# Patient Record
Sex: FEMALE | Race: WHITE | NOT HISPANIC OR LATINO | Employment: FULL TIME | URBAN - NONMETROPOLITAN AREA
[De-identification: names, ages, dates, MRNs, and addresses within clinical notes are randomized per-mention and may not be internally consistent; named-entity substitution may affect disease eponyms.]

---

## 2017-02-09 ENCOUNTER — TELEPHONE (OUTPATIENT)
Dept: LACTATION | Facility: HOSPITAL | Age: 27
End: 2017-02-09

## 2017-02-09 NOTE — TELEPHONE ENCOUNTER
"Infant, Bryant 8 months    Called inquiring about BF status. Dad, Alex, says BF going well though wife's milk productions seems to have slowed past week or so. Family all has colds. Kate pumps at work and BFs when home. Alex reports Kate says \"Come hell or high water I am going for one year!\"  Much praise given. Offered help with supply issue if needed, for Kate to call me for same. (Pt at work at present. )  "

## 2017-02-09 NOTE — TELEPHONE ENCOUNTER
Mailed Kate BF Certificate congratulating her on 8 months of BFing. Also enclosed [employer] card, handout on toddler nursing, weaning gradually after one year, lactation contact card, and note of praise.

## 2017-02-14 ENCOUNTER — HOSPITAL ENCOUNTER (OUTPATIENT)
Dept: GENERAL RADIOLOGY | Facility: HOSPITAL | Age: 27
Discharge: HOME | End: 2017-02-14
Admitting: NURSE PRACTITIONER

## 2017-02-14 ENCOUNTER — LAB (OUTPATIENT)
Dept: LAB | Facility: HOSPITAL | Age: 27
End: 2017-02-14

## 2017-02-14 ENCOUNTER — TRANSCRIBE ORDERS (OUTPATIENT)
Dept: LAB | Facility: HOSPITAL | Age: 27
End: 2017-02-14

## 2017-02-14 DIAGNOSIS — R10.10 UPPER ABDOMINAL PAIN: ICD-10-CM

## 2017-02-14 DIAGNOSIS — R10.10 UPPER ABDOMINAL PAIN: Primary | ICD-10-CM

## 2017-02-14 LAB
ALBUMIN SERPL-MCNC: 4.6 G/DL (ref 3.5–5)
ALBUMIN/GLOB SERPL: 1.3 G/DL (ref 1.1–2.5)
ALP SERPL-CCNC: 97 U/L (ref 24–120)
ALT SERPL W P-5'-P-CCNC: 48 U/L (ref 0–54)
AMYLASE SERPL-CCNC: 53 U/L (ref 30–110)
ANION GAP SERPL CALCULATED.3IONS-SCNC: 14 MMOL/L (ref 4–13)
AST SERPL-CCNC: 27 U/L (ref 7–45)
AUTO MIXED CELLS #: 0.5 10*3/UL (ref 0.1–2.6)
AUTO MIXED CELLS %: 6.9 % (ref 0.1–24)
B-HCG UR QL: NEGATIVE
BILIRUB SERPL-MCNC: 0.4 MG/DL (ref 0.1–1)
BILIRUB UR QL STRIP: NEGATIVE
BUN BLD-MCNC: 13 MG/DL (ref 5–21)
BUN/CREAT SERPL: 27.1
CALCIUM SPEC-SCNC: 9.4 MG/DL (ref 8.4–10.4)
CHLORIDE SERPL-SCNC: 102 MMOL/L (ref 98–110)
CLARITY UR: CLEAR
CO2 SERPL-SCNC: 26 MMOL/L (ref 24–31)
COLOR UR: YELLOW
CREAT BLD-MCNC: 0.48 MG/DL (ref 0.5–1.4)
ERYTHROCYTE [DISTWIDTH] IN BLOOD BY AUTOMATED COUNT: 12.1 % (ref 12–15)
GFR SERPL CREATININE-BSD FRML MDRD: >150 ML/MIN/1.73
GLOBULIN UR ELPH-MCNC: 3.5 GM/DL
GLUCOSE BLD-MCNC: 88 MG/DL (ref 70–100)
GLUCOSE UR STRIP-MCNC: NEGATIVE MG/DL
HCT VFR BLD AUTO: 34.4 % (ref 37–47)
HGB BLD-MCNC: 12 G/DL (ref 12–16)
HGB UR QL STRIP.AUTO: NEGATIVE
KETONES UR QL STRIP: NEGATIVE
LEUKOCYTE ESTERASE UR QL STRIP.AUTO: NEGATIVE
LIPASE SERPL-CCNC: 81 U/L (ref 23–203)
LYMPHOCYTES # BLD AUTO: 2.3 10*3/MM3 (ref 0.8–7)
LYMPHOCYTES NFR BLD AUTO: 30.1 % (ref 15–45)
MCH RBC QN AUTO: 31.2 PG (ref 28–32)
MCHC RBC AUTO-ENTMCNC: 34.9 G/DL (ref 33–36)
MCV RBC AUTO: 89.4 FL (ref 82–98)
NEUTROPHILS # BLD AUTO: 4.9 10*3/MM3 (ref 1.5–8.3)
NEUTROPHILS NFR BLD AUTO: 63 % (ref 39–78)
NITRITE UR QL STRIP: NEGATIVE
PH UR STRIP.AUTO: 6 [PH] (ref 5–8)
PLATELET # BLD AUTO: 238 10*3/MM3 (ref 130–400)
PMV BLD AUTO: 9.4 FL (ref 6–12)
POTASSIUM BLD-SCNC: 4.4 MMOL/L (ref 3.5–5.3)
PROT SERPL-MCNC: 8.1 G/DL (ref 6.3–8.7)
PROT UR QL STRIP: NEGATIVE
RBC # BLD AUTO: 3.85 10*6/MM3 (ref 4.2–5.4)
SODIUM BLD-SCNC: 142 MMOL/L (ref 135–145)
SP GR UR STRIP: 1.02 (ref 1–1.03)
UROBILINOGEN UR QL STRIP: NORMAL
WBC NRBC COR # BLD: 7.7 10*3/MM3 (ref 4.8–10.8)

## 2017-02-14 PROCEDURE — 83690 ASSAY OF LIPASE: CPT

## 2017-02-14 PROCEDURE — 82150 ASSAY OF AMYLASE: CPT

## 2017-02-14 PROCEDURE — 80053 COMPREHEN METABOLIC PANEL: CPT

## 2017-02-14 PROCEDURE — 81025 URINE PREGNANCY TEST: CPT

## 2017-02-14 PROCEDURE — 81003 URINALYSIS AUTO W/O SCOPE: CPT

## 2017-02-14 PROCEDURE — 36415 COLL VENOUS BLD VENIPUNCTURE: CPT

## 2017-02-14 PROCEDURE — 74000 HC ABDOMEN KUB: CPT

## 2017-02-14 PROCEDURE — 85025 COMPLETE CBC W/AUTO DIFF WBC: CPT

## 2017-09-29 ENCOUNTER — TRANSCRIBE ORDERS (OUTPATIENT)
Dept: ADMINISTRATIVE | Facility: HOSPITAL | Age: 27
End: 2017-09-29

## 2017-09-29 ENCOUNTER — APPOINTMENT (OUTPATIENT)
Dept: LAB | Facility: HOSPITAL | Age: 27
End: 2017-09-29

## 2017-09-29 ENCOUNTER — APPOINTMENT (OUTPATIENT)
Dept: ONCOLOGY | Facility: HOSPITAL | Age: 27
End: 2017-09-29

## 2017-09-29 DIAGNOSIS — N80.129 ENDOMETRIOMA: Primary | ICD-10-CM

## 2017-09-29 PROBLEM — O00.90 ECTOPIC PREGNANCY: Status: ACTIVE | Noted: 2017-09-29

## 2017-10-05 ENCOUNTER — INFUSION (OUTPATIENT)
Dept: ONCOLOGY | Facility: HOSPITAL | Age: 27
End: 2017-10-05

## 2017-10-05 ENCOUNTER — TRANSCRIBE ORDERS (OUTPATIENT)
Dept: ADMINISTRATIVE | Facility: HOSPITAL | Age: 27
End: 2017-10-05

## 2017-10-05 ENCOUNTER — APPOINTMENT (OUTPATIENT)
Dept: LAB | Facility: HOSPITAL | Age: 27
End: 2017-10-05

## 2017-10-05 VITALS
SYSTOLIC BLOOD PRESSURE: 138 MMHG | RESPIRATION RATE: 20 BRPM | HEIGHT: 60 IN | WEIGHT: 145 LBS | BODY MASS INDEX: 28.47 KG/M2 | DIASTOLIC BLOOD PRESSURE: 74 MMHG | TEMPERATURE: 99.2 F | OXYGEN SATURATION: 100 % | HEART RATE: 82 BPM

## 2017-10-05 DIAGNOSIS — R01.1 CARDIAC MURMUR: Primary | ICD-10-CM

## 2017-10-05 DIAGNOSIS — O00.90 ECTOPIC PREGNANCY, UNSPECIFIED LOCATION, UNSPECIFIED WHETHER INTRAUTERINE PREGNANCY PRESENT: Primary | ICD-10-CM

## 2017-10-05 LAB
ALBUMIN SERPL-MCNC: 4.7 G/DL (ref 3.5–5)
ALBUMIN/GLOB SERPL: 1.5 G/DL (ref 1.1–2.5)
ALP SERPL-CCNC: 106 U/L (ref 24–120)
ALT SERPL W P-5'-P-CCNC: 86 U/L (ref 0–54)
ANION GAP SERPL CALCULATED.3IONS-SCNC: 13 MMOL/L (ref 4–13)
AST SERPL-CCNC: 48 U/L (ref 7–45)
BILIRUB SERPL-MCNC: 0.3 MG/DL (ref 0.1–1)
BUN BLD-MCNC: 12 MG/DL (ref 5–21)
BUN/CREAT SERPL: 24 (ref 7–25)
CALCIUM SPEC-SCNC: 9.5 MG/DL (ref 8.4–10.4)
CHLORIDE SERPL-SCNC: 104 MMOL/L (ref 98–110)
CO2 SERPL-SCNC: 23 MMOL/L (ref 24–31)
CREAT BLD-MCNC: 0.5 MG/DL (ref 0.5–1.4)
DEPRECATED RDW RBC AUTO: 43.1 FL (ref 40–54)
ERYTHROCYTE [DISTWIDTH] IN BLOOD BY AUTOMATED COUNT: 12.7 % (ref 12–15)
GFR SERPL CREATININE-BSD FRML MDRD: 147 ML/MIN/1.73
GLOBULIN UR ELPH-MCNC: 3.1 GM/DL
GLUCOSE BLD-MCNC: 91 MG/DL (ref 70–100)
HCG INTACT+B SERPL-ACNC: 4025.5 MIU/ML (ref 0–5)
HCT VFR BLD AUTO: 35.6 % (ref 37–47)
HGB BLD-MCNC: 11.7 G/DL (ref 12–16)
MCH RBC QN AUTO: 30.4 PG (ref 28–32)
MCHC RBC AUTO-ENTMCNC: 32.9 G/DL (ref 33–36)
MCV RBC AUTO: 92.5 FL (ref 82–98)
PLATELET # BLD AUTO: 222 10*3/MM3 (ref 130–400)
PMV BLD AUTO: 10.6 FL (ref 6–12)
POTASSIUM BLD-SCNC: 4.3 MMOL/L (ref 3.5–5.3)
PROT SERPL-MCNC: 7.8 G/DL (ref 6.3–8.7)
RBC # BLD AUTO: 3.85 10*6/MM3 (ref 4.2–5.4)
SODIUM BLD-SCNC: 140 MMOL/L (ref 135–145)
WBC NRBC COR # BLD: 5 10*3/MM3 (ref 4.8–10.8)

## 2017-10-05 PROCEDURE — 84702 CHORIONIC GONADOTROPIN TEST: CPT | Performed by: OBSTETRICS & GYNECOLOGY

## 2017-10-05 PROCEDURE — 96401 CHEMO ANTI-NEOPL SQ/IM: CPT

## 2017-10-05 PROCEDURE — 85027 COMPLETE CBC AUTOMATED: CPT | Performed by: OBSTETRICS & GYNECOLOGY

## 2017-10-05 PROCEDURE — 80053 COMPREHEN METABOLIC PANEL: CPT | Performed by: OBSTETRICS & GYNECOLOGY

## 2017-10-05 PROCEDURE — 25010000002 METHOTREXATE PF 100 MG/4ML SOLUTION: Performed by: OBSTETRICS & GYNECOLOGY

## 2017-10-05 PROCEDURE — 36415 COLL VENOUS BLD VENIPUNCTURE: CPT

## 2017-10-05 RX ORDER — METHOTREXATE 25 MG/ML
50 INJECTION INTRA-ARTERIAL; INTRAMUSCULAR; INTRATHECAL; INTRAVENOUS ONCE
Status: CANCELLED | OUTPATIENT
Start: 2017-10-05

## 2017-10-05 RX ORDER — METHOTREXATE 25 MG/ML
50 INJECTION, SOLUTION INTRA-ARTERIAL; INTRAMUSCULAR; INTRATHECAL; INTRAVENOUS ONCE
Status: COMPLETED | OUTPATIENT
Start: 2017-10-05 | End: 2017-10-05

## 2017-10-05 RX ADMIN — METHOTREXATE 82 MG: 25 INJECTION, SOLUTION INTRA-ARTERIAL; INTRAMUSCULAR; INTRATHECAL; INTRAVENOUS at 13:30

## 2017-10-05 NOTE — PATIENT INSTRUCTIONS
Methotrexate injection  What is this medicine?  METHOTREXATE (METH oh TREX ate) is a chemotherapy drug used to treat cancer including breast cancer, leukemia, and lymphoma. This medicine can also be used to treat psoriasis and certain kinds of arthritis.  This medicine may be used for other purposes; ask your health care provider or pharmacist if you have questions.  What should I tell my health care provider before I take this medicine?  They need to know if you have any of these conditions:  -fluid in the stomach area or lungs  -if you often drink alcohol  -infection or immune system problems  -kidney disease  -liver disease  -low blood counts, like low white cell, platelet, or red cell counts  -lung disease  -radiation therapy  -stomach ulcers  -ulcerative colitis  -an unusual or allergic reaction to methotrexate, other medicines, foods, dyes, or preservatives  -pregnant or trying to get pregnant  -breast-feeding  How should I use this medicine?  This medicine is for infusion into a vein or for injection into muscle or into the spinal fluid (whichever applies). It is usually given by a health care professional in a hospital or clinic setting.  In rare cases, you might get this medicine at home. You will be taught how to give this medicine. Use exactly as directed. Take your medicine at regular intervals. Do not take your medicine more often than directed.  If this medicine is used for arthritis or psoriasis, it should be taken weekly, NOT daily.  It is important that you put your used needles and syringes in a special sharps container. Do not put them in a trash can. If you do not have a sharps container, call your pharmacist or healthcare provider to get one.  Talk to your pediatrician regarding the use of this medicine in children. While this drug may be prescribed for children as young as 2 years for selected conditions, precautions do apply.  Overdosage: If you think you have taken too much of this medicine  contact a poison control center or emergency room at once.  NOTE: This medicine is only for you. Do not share this medicine with others.  What if I miss a dose?  It is important not to miss your dose. Call your doctor or health care professional if you are unable to keep an appointment. If you give yourself the medicine and you miss a dose, talk with your doctor or health care professional. Do not take double or extra doses.  What may interact with this medicine?  This medicine may interact with the following medications:  -acitretin  -aspirin or aspirin-like medicines including salicylates  -azathioprine  -certain antibiotics like chloramphenicol, penicillin, tetracycline  -certain medicines for stomach problems like esomeprazole, omeprazole, pantoprazole  -cyclosporine  -gold  -hydroxychloroquine  -live virus vaccines  -mercaptopurine  -NSAIDs, medicines for pain and inflammation, like ibuprofen or naproxen  -other cytotoxic agents  -penicillamine  -phenylbutazone  -phenytoin  -probenacid  -retinoids such as isotretinoin and tretinoin  -steroid medicines like prednisone or cortisone  -sulfonamides like sulfasalazine and trimethoprim/sulfamethoxazole  -theophylline  This list may not describe all possible interactions. Give your health care provider a list of all the medicines, herbs, non-prescription drugs, or dietary supplements you use. Also tell them if you smoke, drink alcohol, or use illegal drugs. Some items may interact with your medicine.  What should I watch for while using this medicine?  Avoid alcoholic drinks.  In some cases, you may be given additional medicines to help with side effects. Follow all directions for their use.  This medicine can make you more sensitive to the sun. Keep out of the sun. If you cannot avoid being in the sun, wear protective clothing and use sunscreen. Do not use sun lamps or tanning beds/booths.  You may get drowsy or dizzy. Do not drive, use machinery, or do anything that  needs mental alertness until you know how this medicine affects you. Do not stand or sit up quickly, especially if you are an older patient. This reduces the risk of dizzy or fainting spells.  You may need blood work done while you are taking this medicine.  Call your doctor or health care professional for advice if you get a fever, chills or sore throat, or other symptoms of a cold or flu. Do not treat yourself. This drug decreases your body's ability to fight infections. Try to avoid being around people who are sick.  This medicine may increase your risk to bruise or bleed. Call your doctor or health care professional if you notice any unusual bleeding.  Check with your doctor or health care professional if you get an attack of severe diarrhea, nausea and vomiting, or if you sweat a lot. The loss of too much body fluid can make it dangerous for you to take this medicine.  Talk to your doctor about your risk of cancer. You may be more at risk for certain types of cancers if you take this medicine.  Both men and women must use effective birth control with this medicine. Do not become pregnant while taking this medicine or until at least 1 normal menstrual cycle has occurred after stopping it. Women should inform their doctor if they wish to become pregnant or think they might be pregnant. Men should not father a child while taking this medicine and for 3 months after stopping it. There is a potential for serious side effects to an unborn child. Talk to your health care professional or pharmacist for more information. Do not breast-feed an infant while taking this medicine.  What side effects may I notice from receiving this medicine?  Side effects that you should report to your doctor or health care professional as soon as possible:  -allergic reactions like skin rash, itching or hives, swelling of the face, lips, or tongue  -back pain  -breathing problems or shortness of breath  -confusion  -diarrhea  -dry,  nonproductive cough  -low blood counts - this medicine may decrease the number of white blood cells, red blood cells and platelets. You may be at increased risk of infections and bleeding  -mouth sores  -redness, blistering, peeling or loosening of the skin, including inside the mouth  -seizures  -severe headaches  -signs of infection - fever or chills, cough, sore throat, pain or difficulty passing urine  -signs and symptoms of bleeding such as bloody or black, tarry stools; red or dark-brown urine; spitting up blood or brown material that looks like coffee grounds; red spots on the skin; unusual bruising or bleeding from the eye, gums, or nose  -signs and symptoms of kidney injury like trouble passing urine or change in the amount of urine  -signs and symptoms of liver injury like dark yellow or brown urine; general ill feeling or flu-like  symptoms; light-colored stools; loss of appetite; nausea; right upper belly pain; unusually weak or tired; yellowing of the eyes or skin  -stiff neck  -vomiting  Side effects that usually do not require medical attention (report to your doctor or health care professional if they continue or are bothersome):  -dizziness  -hair loss  -headache  -stomach pain  -upset stomach  This list may not describe all possible side effects. Call your doctor for medical advice about side effects. You may report side effects to FDA at 1-571-FDA-1887.  Where should I keep my medicine?  If you are using this medicine at home, you will be instructed on how to store this medicine. Throw away any unused medicine after the expiration date on the label.  NOTE: This sheet is a summary. It may not cover all possible information. If you have questions about this medicine, talk to your doctor, pharmacist, or health care provider.     © 2017, [employer]. (2016-04-07 12:36:41)  Methotrexate Treatment for an Ectopic Pregnancy, Care After  Refer to this sheet in the next few weeks. These  instructions provide you with information on caring for yourself after your procedure. Your health care provider may also give you more specific instructions. Your treatment has been planned according to current medical practices, but problems sometimes occur. Call your health care provider if you have any problems or questions after your procedure.  WHAT TO EXPECT AFTER THE PROCEDURE  You may have some abdominal cramping, vaginal bleeding, and fatigue in the first few days after taking methotrexate. Some other possible side effects of methotrexate include:  · Nausea.  · Vomiting.  · Diarrhea.  · Mouth sores.  · Swelling or irritation of the lining of your lungs (pneumonitis).  · Liver damage.  · Hair loss.  HOME CARE INSTRUCTIONS   After you have received the methotrexate medicine, you need to be careful of your activities and watch your condition for several weeks. It may take 1 week before your hormone levels return to normal.  · Keep all follow-up appointments as directed by your health care provider.  · Avoid traveling too far away from your health care provider.  · Do not have sexual intercourse until your health care provider says it is safe to do so.  · You may resume your usual diet.  · Limit strenuous activity.  · Do not take folic acid, prenatal vitamins, or other vitamins that contain folic acid.  · Do not take aspirin, ibuprofen, or naproxen (nonsteroidal anti-inflammatory drugs [NSAIDs]).  · Do not drink alcohol.  SEEK MEDICAL CARE IF:   · You cannot control your nausea and vomiting.  · You cannot control your diarrhea.  · You have sores in your mouth and want treatment.  · You need pain medicine for your abdominal pain.  · You have a rash.  · You are having a reaction to the medicine.  SEEK IMMEDIATE MEDICAL CARE IF:   · You have increasing abdominal or pelvic pain.  · You notice increased bleeding.  · You feel light-headed, or you faint.  · You have shortness of breath.  · Your heart rate  increases.  · You have a cough.  · You have chills.  · You have a fever.     This information is not intended to replace advice given to you by your health care provider. Make sure you discuss any questions you have with your health care provider.     Document Released: 12/06/2012 Document Revised: 12/23/2014 Document Reviewed: 10/06/2014  [employer] Patient Education ©2017 [employer]

## 2017-10-05 NOTE — PROGRESS NOTES
Patient reports to clinic for Methotrexate IM.  Patient tolerated well, denies issues other than some soreness.  Patient monitored for 30 minutes, VS WNL, pt discharged in stable condition.  PATRICIA Good

## 2017-10-11 ENCOUNTER — HOSPITAL ENCOUNTER (OUTPATIENT)
Dept: CARDIOLOGY | Facility: HOSPITAL | Age: 27
Discharge: HOME | End: 2017-10-11
Attending: INTERNAL MEDICINE | Admitting: INTERNAL MEDICINE

## 2017-10-11 VITALS — BODY MASS INDEX: 28.47 KG/M2 | HEIGHT: 60 IN | WEIGHT: 145 LBS

## 2017-10-11 DIAGNOSIS — R01.1 CARDIAC MURMUR: ICD-10-CM

## 2017-10-11 LAB
BH CV ECHO MEAS - AO MAX PG (FULL): 6.6 MMHG
BH CV ECHO MEAS - AO MAX PG: 10.9 MMHG
BH CV ECHO MEAS - AO MEAN PG (FULL): 5 MMHG
BH CV ECHO MEAS - AO MEAN PG: 8 MMHG
BH CV ECHO MEAS - AO ROOT AREA (BSA CORRECTED): 1.7
BH CV ECHO MEAS - AO ROOT AREA: 5.7 CM^2
BH CV ECHO MEAS - AO ROOT DIAM: 2.7 CM
BH CV ECHO MEAS - AO V2 MAX: 165 CM/SEC
BH CV ECHO MEAS - AO V2 MEAN: 142 CM/SEC
BH CV ECHO MEAS - AO V2 VTI: 43.3 CM
BH CV ECHO MEAS - AVA(I,A): 1.6 CM^2
BH CV ECHO MEAS - AVA(I,D): 1.6 CM^2
BH CV ECHO MEAS - AVA(V,A): 1.8 CM^2
BH CV ECHO MEAS - AVA(V,D): 1.8 CM^2
BH CV ECHO MEAS - BSA(HAYCOCK): 1.7 M^2
BH CV ECHO MEAS - BSA: 1.6 M^2
BH CV ECHO MEAS - BZI_BMI: 28.3 KILOGRAMS/M^2
BH CV ECHO MEAS - BZI_METRIC_HEIGHT: 152.4 CM
BH CV ECHO MEAS - BZI_METRIC_WEIGHT: 65.8 KG
BH CV ECHO MEAS - CONTRAST EF 4CH: 61.1 ML/M^2
BH CV ECHO MEAS - EDV(CUBED): 91.1 ML
BH CV ECHO MEAS - EDV(MOD-SP4): 88.2 ML
BH CV ECHO MEAS - EDV(TEICH): 92.4 ML
BH CV ECHO MEAS - EF(CUBED): 70.4 %
BH CV ECHO MEAS - EF(MOD-SP4): 61.1 %
BH CV ECHO MEAS - EF(TEICH): 62.1 %
BH CV ECHO MEAS - ESV(CUBED): 27 ML
BH CV ECHO MEAS - ESV(MOD-SP4): 34.3 ML
BH CV ECHO MEAS - ESV(TEICH): 35 ML
BH CV ECHO MEAS - FS: 33.3 %
BH CV ECHO MEAS - IVS/LVPW: 0.78
BH CV ECHO MEAS - IVSD: 0.7 CM
BH CV ECHO MEAS - LA DIMENSION: 3.4 CM
BH CV ECHO MEAS - LA/AO: 1.3
BH CV ECHO MEAS - LV DIASTOLIC VOL/BSA (35-75): 54.2 ML/M^2
BH CV ECHO MEAS - LV MASS(C)D: 113.6 GRAMS
BH CV ECHO MEAS - LV MASS(C)DI: 69.8 GRAMS/M^2
BH CV ECHO MEAS - LV MAX PG: 4.3 MMHG
BH CV ECHO MEAS - LV MEAN PG: 3 MMHG
BH CV ECHO MEAS - LV SYSTOLIC VOL/BSA (12-30): 21.1 ML/M^2
BH CV ECHO MEAS - LV V1 MAX: 104 CM/SEC
BH CV ECHO MEAS - LV V1 MEAN: 80.5 CM/SEC
BH CV ECHO MEAS - LV V1 VTI: 24.6 CM
BH CV ECHO MEAS - LVIDD: 4.5 CM
BH CV ECHO MEAS - LVIDS: 3 CM
BH CV ECHO MEAS - LVLD AP4: 8.6 CM
BH CV ECHO MEAS - LVLS AP4: 6.9 CM
BH CV ECHO MEAS - LVOT AREA (M): 2.8 CM^2
BH CV ECHO MEAS - LVOT AREA: 2.8 CM^2
BH CV ECHO MEAS - LVOT DIAM: 1.9 CM
BH CV ECHO MEAS - LVPWD: 0.9 CM
BH CV ECHO MEAS - MR ALIAS VEL: 38.5 CM/SEC
BH CV ECHO MEAS - MR ERO: 0.25 CM^2
BH CV ECHO MEAS - MR FLOW RATE: 60.5 CM^3/SEC
BH CV ECHO MEAS - MR MAX PG: 23.8 MMHG
BH CV ECHO MEAS - MR MAX VEL: 244 CM/SEC
BH CV ECHO MEAS - MR MEAN PG: 18 MMHG
BH CV ECHO MEAS - MR MEAN VEL: 202 CM/SEC
BH CV ECHO MEAS - MR PISA RADIUS: 0.5 CM
BH CV ECHO MEAS - MR PISA: 1.6 CM^2
BH CV ECHO MEAS - MR VOLUME: 22.6 ML
BH CV ECHO MEAS - MR VTI: 91 CM
BH CV ECHO MEAS - MV A MAX VEL: 69.4 CM/SEC
BH CV ECHO MEAS - MV DEC TIME: 0.32 SEC
BH CV ECHO MEAS - MV E MAX VEL: 103 CM/SEC
BH CV ECHO MEAS - MV E/A: 1.5
BH CV ECHO MEAS - RAP SYSTOLE: 5 MMHG
BH CV ECHO MEAS - RVSP: 21.8 MMHG
BH CV ECHO MEAS - SI(AO): 152.3 ML/M^2
BH CV ECHO MEAS - SI(CUBED): 39.4 ML/M^2
BH CV ECHO MEAS - SI(LVOT): 42.8 ML/M^2
BH CV ECHO MEAS - SI(MOD-SP4): 33.1 ML/M^2
BH CV ECHO MEAS - SI(TEICH): 35.3 ML/M^2
BH CV ECHO MEAS - SV(AO): 247.9 ML
BH CV ECHO MEAS - SV(CUBED): 64.1 ML
BH CV ECHO MEAS - SV(LVOT): 69.7 ML
BH CV ECHO MEAS - SV(MOD-SP4): 53.9 ML
BH CV ECHO MEAS - SV(TEICH): 57.4 ML
BH CV ECHO MEAS - TR MAX VEL: 205 CM/SEC
LEFT ATRIUM VOLUME INDEX: 28.3 ML/M2
LEFT ATRIUM VOLUME: 46.2 CM3
LV EF 2D ECHO EST: 65 %

## 2017-10-11 PROCEDURE — 93306 TTE W/DOPPLER COMPLETE: CPT | Performed by: INTERNAL MEDICINE

## 2017-10-11 PROCEDURE — 93306 TTE W/DOPPLER COMPLETE: CPT

## 2017-11-17 ENCOUNTER — TRANSCRIBE ORDERS (OUTPATIENT)
Dept: ADMINISTRATIVE | Facility: HOSPITAL | Age: 27
End: 2017-11-17

## 2017-11-17 DIAGNOSIS — N97.1 TUBAL OCCLUSION: Primary | ICD-10-CM

## 2017-11-24 ENCOUNTER — HOSPITAL ENCOUNTER (OUTPATIENT)
Dept: GENERAL RADIOLOGY | Facility: HOSPITAL | Age: 27
Discharge: HOME | End: 2017-11-24
Attending: OBSTETRICS & GYNECOLOGY | Admitting: OBSTETRICS & GYNECOLOGY

## 2017-11-24 VITALS — SYSTOLIC BLOOD PRESSURE: 129 MMHG | DIASTOLIC BLOOD PRESSURE: 90 MMHG | HEART RATE: 72 BPM | OXYGEN SATURATION: 100 %

## 2017-11-24 DIAGNOSIS — N97.1 TUBAL OCCLUSION: ICD-10-CM

## 2017-11-24 PROCEDURE — 74740 X-RAY FEMALE GENITAL TRACT: CPT

## 2017-11-24 PROCEDURE — 0 IOPAMIDOL 61 % SOLUTION: Performed by: OBSTETRICS & GYNECOLOGY

## 2017-11-24 RX ADMIN — IOPAMIDOL 10 ML: 612 INJECTION, SOLUTION INTRAVENOUS at 10:20

## 2018-05-15 ENCOUNTER — APPOINTMENT (OUTPATIENT)
Dept: ONCOLOGY | Facility: HOSPITAL | Age: 28
End: 2018-05-15

## 2018-05-15 ENCOUNTER — HOSPITAL ENCOUNTER (OUTPATIENT)
Facility: HOSPITAL | Age: 28
Discharge: HOME | End: 2018-05-15
Attending: OBSTETRICS & GYNECOLOGY | Admitting: OBSTETRICS & GYNECOLOGY

## 2018-05-15 VITALS — BODY MASS INDEX: 30.63 KG/M2 | HEIGHT: 60 IN | WEIGHT: 156 LBS

## 2018-05-15 PROCEDURE — 96372 THER/PROPH/DIAG INJ SC/IM: CPT

## 2018-05-15 PROCEDURE — G0378 HOSPITAL OBSERVATION PER HR: HCPCS

## 2018-05-15 PROCEDURE — 25010000002 METHOTREXATE SODIUM SOLUTION: Performed by: OBSTETRICS & GYNECOLOGY

## 2018-05-15 RX ORDER — METHOTREXATE SODIUM 25 MG/ML
85 INJECTION, SOLUTION INTRA-ARTERIAL; INTRAMUSCULAR; INTRAVENOUS ONCE
Status: COMPLETED | OUTPATIENT
Start: 2018-05-15 | End: 2018-05-15

## 2018-05-15 RX ADMIN — METHOTREXATE 85 MG: 25 INJECTION, SOLUTION INTRA-ARTERIAL; INTRAMUSCULAR; INTRAVENOUS at 16:20

## 2018-05-16 PROBLEM — Z34.90 PREGNANCY: Status: ACTIVE | Noted: 2018-05-16

## 2018-05-18 ENCOUNTER — HOSPITAL ENCOUNTER (OUTPATIENT)
Facility: HOSPITAL | Age: 28
Setting detail: OBSERVATION
Discharge: HOME | End: 2018-05-19
Attending: OBSTETRICS & GYNECOLOGY | Admitting: OBSTETRICS & GYNECOLOGY

## 2018-05-18 PROBLEM — E86.0 DEHYDRATION: Status: ACTIVE | Noted: 2018-05-18

## 2018-05-18 LAB
ALBUMIN SERPL-MCNC: 4.5 G/DL (ref 3.5–5)
ALBUMIN/GLOB SERPL: 1.4 G/DL (ref 1.1–2.5)
ALP SERPL-CCNC: 83 U/L (ref 24–120)
ALT SERPL W P-5'-P-CCNC: 42 U/L (ref 0–54)
ANION GAP SERPL CALCULATED.3IONS-SCNC: 11 MMOL/L (ref 4–13)
AST SERPL-CCNC: 43 U/L (ref 7–45)
BACTERIA UR QL AUTO: ABNORMAL /HPF
BILIRUB SERPL-MCNC: 0.6 MG/DL (ref 0.1–1)
BILIRUB UR QL STRIP: NEGATIVE
BUN BLD-MCNC: 9 MG/DL (ref 5–21)
BUN/CREAT SERPL: 16.7 (ref 7–25)
CALCIUM SPEC-SCNC: 8.9 MG/DL (ref 8.4–10.4)
CHLORIDE SERPL-SCNC: 100 MMOL/L (ref 98–110)
CLARITY UR: CLEAR
CO2 SERPL-SCNC: 27 MMOL/L (ref 24–31)
COLOR UR: YELLOW
CREAT BLD-MCNC: 0.54 MG/DL (ref 0.5–1.4)
DEPRECATED RDW RBC AUTO: 39.5 FL (ref 40–54)
ERYTHROCYTE [DISTWIDTH] IN BLOOD BY AUTOMATED COUNT: 11.9 % (ref 12–15)
GFR SERPL CREATININE-BSD FRML MDRD: 133 ML/MIN/1.73
GLOBULIN UR ELPH-MCNC: 3.2 GM/DL
GLUCOSE BLD-MCNC: 104 MG/DL (ref 70–100)
GLUCOSE UR STRIP-MCNC: NEGATIVE MG/DL
HCG INTACT+B SERPL-ACNC: 324.58 MIU/ML (ref 0–5)
HCT VFR BLD AUTO: 32.3 % (ref 37–47)
HGB BLD-MCNC: 10.9 G/DL (ref 12–16)
HGB UR QL STRIP.AUTO: ABNORMAL
HYALINE CASTS UR QL AUTO: ABNORMAL /LPF
KETONES UR QL STRIP: NEGATIVE
LEUKOCYTE ESTERASE UR QL STRIP.AUTO: NEGATIVE
MCH RBC QN AUTO: 30.5 PG (ref 28–32)
MCHC RBC AUTO-ENTMCNC: 33.7 G/DL (ref 33–36)
MCV RBC AUTO: 90.5 FL (ref 82–98)
NITRITE UR QL STRIP: NEGATIVE
PH UR STRIP.AUTO: 6 [PH] (ref 5–8)
PLATELET # BLD AUTO: 185 10*3/MM3 (ref 130–400)
PMV BLD AUTO: 10.1 FL (ref 6–12)
POTASSIUM BLD-SCNC: 4.1 MMOL/L (ref 3.5–5.3)
PROT SERPL-MCNC: 7.7 G/DL (ref 6.3–8.7)
PROT UR QL STRIP: NEGATIVE
RBC # BLD AUTO: 3.57 10*6/MM3 (ref 4.2–5.4)
RBC # UR: ABNORMAL /HPF
REF LAB TEST METHOD: ABNORMAL
SODIUM BLD-SCNC: 138 MMOL/L (ref 135–145)
SP GR UR STRIP: 1.01 (ref 1–1.03)
SQUAMOUS #/AREA URNS HPF: ABNORMAL /HPF
UROBILINOGEN UR QL STRIP: ABNORMAL
WBC NRBC COR # BLD: 4.44 10*3/MM3 (ref 4.8–10.8)
WBC UR QL AUTO: ABNORMAL /HPF

## 2018-05-18 PROCEDURE — G0378 HOSPITAL OBSERVATION PER HR: HCPCS

## 2018-05-18 PROCEDURE — 85027 COMPLETE CBC AUTOMATED: CPT | Performed by: OBSTETRICS & GYNECOLOGY

## 2018-05-18 PROCEDURE — 80053 COMPREHEN METABOLIC PANEL: CPT | Performed by: OBSTETRICS & GYNECOLOGY

## 2018-05-18 PROCEDURE — 81001 URINALYSIS AUTO W/SCOPE: CPT | Performed by: OBSTETRICS & GYNECOLOGY

## 2018-05-18 PROCEDURE — 25010000002 KETOROLAC TROMETHAMINE PER 15 MG: Performed by: OBSTETRICS & GYNECOLOGY

## 2018-05-18 PROCEDURE — 84702 CHORIONIC GONADOTROPIN TEST: CPT | Performed by: OBSTETRICS & GYNECOLOGY

## 2018-05-18 PROCEDURE — 96375 TX/PRO/DX INJ NEW DRUG ADDON: CPT

## 2018-05-18 PROCEDURE — 96374 THER/PROPH/DIAG INJ IV PUSH: CPT

## 2018-05-18 PROCEDURE — 25010000002 ONDANSETRON PER 1 MG: Performed by: OBSTETRICS & GYNECOLOGY

## 2018-05-18 RX ORDER — DEXTROSE, SODIUM CHLORIDE, SODIUM LACTATE, POTASSIUM CHLORIDE, AND CALCIUM CHLORIDE 5; .6; .31; .03; .02 G/100ML; G/100ML; G/100ML; G/100ML; G/100ML
999 INJECTION, SOLUTION INTRAVENOUS ONCE
Status: COMPLETED | OUTPATIENT
Start: 2018-05-18 | End: 2018-05-18

## 2018-05-18 RX ORDER — DEXTROSE, SODIUM CHLORIDE, SODIUM LACTATE, POTASSIUM CHLORIDE, AND CALCIUM CHLORIDE 5; .6; .31; .03; .02 G/100ML; G/100ML; G/100ML; G/100ML; G/100ML
150 INJECTION, SOLUTION INTRAVENOUS CONTINUOUS
Status: DISCONTINUED | OUTPATIENT
Start: 2018-05-18 | End: 2018-05-19 | Stop reason: HOSPADM

## 2018-05-18 RX ORDER — PROMETHAZINE HYDROCHLORIDE 25 MG/ML
25 INJECTION, SOLUTION INTRAMUSCULAR; INTRAVENOUS EVERY 6 HOURS PRN
Status: DISCONTINUED | OUTPATIENT
Start: 2018-05-18 | End: 2018-05-19 | Stop reason: HOSPADM

## 2018-05-18 RX ORDER — SCOLOPAMINE TRANSDERMAL SYSTEM 1 MG/1
1 PATCH, EXTENDED RELEASE TRANSDERMAL
Status: DISCONTINUED | OUTPATIENT
Start: 2018-05-18 | End: 2018-05-19 | Stop reason: HOSPADM

## 2018-05-18 RX ORDER — ONDANSETRON HCL IN 0.9 % NACL 8 MG/50 ML
8 INTRAVENOUS SOLUTION, PIGGYBACK (ML) INTRAVENOUS EVERY 6 HOURS PRN
Status: DISCONTINUED | OUTPATIENT
Start: 2018-05-18 | End: 2018-05-19 | Stop reason: HOSPADM

## 2018-05-18 RX ORDER — KETOROLAC TROMETHAMINE 30 MG/ML
30 INJECTION, SOLUTION INTRAMUSCULAR; INTRAVENOUS EVERY 6 HOURS PRN
Status: DISCONTINUED | OUTPATIENT
Start: 2018-05-18 | End: 2018-05-19

## 2018-05-18 RX ADMIN — SODIUM CHLORIDE, SODIUM LACTATE, POTASSIUM CHLORIDE, CALCIUM CHLORIDE AND DEXTROSE MONOHYDRATE 999 ML/HR: 5; 600; 310; 30; 20 INJECTION, SOLUTION INTRAVENOUS at 12:37

## 2018-05-18 RX ADMIN — ONDANSETRON HYDROCHLORIDE 8 MG: 2 INJECTION INTRAMUSCULAR; INTRAVENOUS at 21:48

## 2018-05-18 RX ADMIN — KETOROLAC TROMETHAMINE 30 MG: 30 INJECTION, SOLUTION INTRAMUSCULAR at 17:12

## 2018-05-18 RX ADMIN — ASCORBIC ACID, VITAMIN A PALMITATE, CHOLECALCIFEROL, THIAMINE HYDROCHLORIDE, RIBOFLAVIN-5 PHOSPHATE SODIUM, PYRIDOXINE HYDROCHLORIDE, NIACINAMIDE, DEXPANTHENOL, ALPHA-TOCOPHEROL ACETATE, VITAMIN K1, FOLIC ACID, BIOTIN, CYANOCOBALAMIN: 200; 3300; 200; 6; 3.6; 6; 40; 15; 10; 150; 600; 60; 5 INJECTION, SOLUTION INTRAVENOUS at 16:52

## 2018-05-18 RX ADMIN — SCOPALAMINE 1 PATCH: 1 PATCH, EXTENDED RELEASE TRANSDERMAL at 12:23

## 2018-05-18 NOTE — H&P
[facility]  Obstetric History and Physical        Subjective     Patient is a 28 y.o. female recent diagnosis of possible early ectopic vs MAB; serum HCG with only 20 point rise in 72 hours.  No evidence of either intrauterine/extrauterine pregnancy on serial ultrasounds received methotrexate for presumptive  Diagnosis of early ectopic pregnancy on weds.  Dosage calculation confirmed by pharmacy prior to injection.  Patient stated two days later with general malaise and fatigue  And then started with vomiting and diarrhea early today.  No fevers and no recent exposure to infectious agent.  Possible adverse  Reaction to methotrexate.  No abdominal pain, and ultrasound in office only significant for PCO.  Patient was admitted for OSV/23 hour for IVF hydration and IV antiemetics.    The following portions of the patients history were reviewed and updated as appropriate: current medications, allergies, past medical history, past surgical history, past family history and past social history .       Prenatal Information:  Prenatal Results    The patient does not have a working LIZBETH. Some results will not display without a working LIZBETH.   Initial Prenatal Labs     Test Value Reference Range Date Time    Hemoglobin        Hematocrit        Platelets        Rubella IgG        Hepatitis B SAg        Hepatitis C Ab        RPR        ABO        Rh        Antibody Screen        HIV        Urine Culture        Gonorrhea        Chlamydia        TSH              2nd and 3rd Trimester     Test Value Reference Range Date Time    Hemoglobin (repeated)        Hematocrit (repeated)        GCT        Antibody Screen (repeated)        GTT Fasting        GTT 1 Hr        GTT 2 Hr        GTT 3 Hr        Group B Strep              Drug Screening     Test Value Reference Range Date Time    Amphetamine Screen        Barbiturate Screen        Benzodiazepine Screen        Methadone Screen        Phencyclidine Screen        Opiates Screen        THC  Screen        Cocaine Screen        Propoxyphene Screen        Buprenorphine Screen        Methamphetamine Screen        Oxycodone Screen        Tryicyclic Antidepressants Screen              Other (Risk screening)     Test Value Reference Range Date Time    Varicella IgG        Parvovirus IgG        CMV IgG        Cystic Fibrosis        Hemoglobin electrophoresis        NIPT        MSAFP-4        AFP (for NTD only)                       Past OB History:   Prior FT delivery  C/S              Ectopic pregnancy x 2 (MXT treated)                Past Medical History:  Denies              Past Surgical History:  Lasik, C/S              Social History:  Denies tobacco, alcohol and drug usage              NKDA              Family History:  CAD, DM              Medications:  Metformin              Gyn History:  Denies    Objective       Vital Signs Range for the last 24 hours  Temperature: Temp:  [98.9 °F (37.2 °C)-99.8 °F (37.7 °C)] 99.8 °F (37.7 °C)   Temp Source: Temp src: Temporal Artery    BP: BP: (126-128)/(65-76) 128/65   Pulse: Heart Rate:  [100-111] 100   Respirations: Resp:  [18-20] 18   SPO2: SpO2:  [99 %-100 %] 99 %   O2 Amount (l/min):     O2 Devices Device (Oxygen Therapy): room air   Weight:       Physical Examination: General appearance - dehydrated  Mental status - alert, oriented to person, place, and time  Neck - supple, no significant adenopathy  Chest - clear to auscultation, no wheezes, rales or rhonchi, symmetric air entry  Heart - normal rate, regular rhythm, normal S1, S2, no murmurs, rubs, clicks or gallops  Abdomen - soft, nontender, nondistended, no masses or organomegaly  Breasts - patient declines to have breast exam  Pelvic - normal external genitalia, vulva, vagina, cervix, uterus and adnexa  Back exam - full range of motion, no tenderness, palpable spasm or pain on motion  Neurological - alert, oriented, normal speech, no focal findings or movement disorder noted  Musculoskeletal - no joint  tenderness, deformity or swelling  Extremities - peripheral pulses normal, no pedal edema, no clubbing or cyanosis  Skin - dry with poor turgor           Assessment:  1.  S/P Methotrexate  2.  Dehydration    Plan:  1.  IVF hydration  2.  IV antiemetic  3.  Labs  4.  OSV- anticipate discharge in < 23 hours if clinically stable      Brandee Weinstein DO  5/18/2018  12:58 PM

## 2018-05-18 NOTE — PLAN OF CARE
Problem: Patient Care Overview  Goal: Plan of Care Review  Outcome: Ongoing (interventions implemented as appropriate)    Goal: Individualization and Mutuality  Outcome: Ongoing (interventions implemented as appropriate)    Goal: Discharge Needs Assessment  Outcome: Ongoing (interventions implemented as appropriate)    Goal: Interprofessional Rounds/Family Conf  Outcome: Ongoing (interventions implemented as appropriate)      Problem: Fluid Volume Deficit (Adult)  Goal: Identify Related Risk Factors and Signs and Symptoms  Outcome: Ongoing (interventions implemented as appropriate)    Goal: Optimal Fluid Balance  Outcome: Ongoing (interventions implemented as appropriate)

## 2018-05-19 VITALS
HEART RATE: 64 BPM | SYSTOLIC BLOOD PRESSURE: 126 MMHG | RESPIRATION RATE: 16 BRPM | TEMPERATURE: 98.8 F | OXYGEN SATURATION: 99 % | WEIGHT: 153.5 LBS | BODY MASS INDEX: 30.14 KG/M2 | DIASTOLIC BLOOD PRESSURE: 47 MMHG | HEIGHT: 60 IN

## 2018-05-19 PROCEDURE — 25010000002 KETOROLAC TROMETHAMINE PER 15 MG: Performed by: OBSTETRICS & GYNECOLOGY

## 2018-05-19 PROCEDURE — G0378 HOSPITAL OBSERVATION PER HR: HCPCS

## 2018-05-19 PROCEDURE — 96376 TX/PRO/DX INJ SAME DRUG ADON: CPT

## 2018-05-19 RX ORDER — LOPERAMIDE HYDROCHLORIDE 2 MG/1
4 CAPSULE ORAL ONCE
Status: COMPLETED | OUTPATIENT
Start: 2018-05-19 | End: 2018-05-19

## 2018-05-19 RX ORDER — ONDANSETRON 4 MG/1
4 TABLET, ORALLY DISINTEGRATING ORAL EVERY 8 HOURS PRN
Qty: 40 TABLET | Refills: 1 | Status: SHIPPED | OUTPATIENT
Start: 2018-05-19 | End: 2018-06-18

## 2018-05-19 RX ORDER — KETOROLAC TROMETHAMINE 30 MG/ML
30 INJECTION, SOLUTION INTRAMUSCULAR; INTRAVENOUS ONCE
Status: COMPLETED | OUTPATIENT
Start: 2018-05-19 | End: 2018-05-19

## 2018-05-19 RX ORDER — KETOROLAC TROMETHAMINE 30 MG/ML
30 INJECTION, SOLUTION INTRAMUSCULAR; INTRAVENOUS EVERY 6 HOURS PRN
Status: DISCONTINUED | OUTPATIENT
Start: 2018-05-19 | End: 2018-05-19 | Stop reason: HOSPADM

## 2018-05-19 RX ADMIN — KETOROLAC TROMETHAMINE 30 MG: 30 INJECTION, SOLUTION INTRAMUSCULAR at 09:41

## 2018-05-19 RX ADMIN — SODIUM CHLORIDE, SODIUM LACTATE, POTASSIUM CHLORIDE, CALCIUM CHLORIDE AND DEXTROSE MONOHYDRATE 150 ML/HR: 5; 600; 310; 30; 20 INJECTION, SOLUTION INTRAVENOUS at 09:41

## 2018-05-19 RX ADMIN — LOPERAMIDE HYDROCHLORIDE 4 MG: 2 CAPSULE ORAL at 09:41

## 2018-05-19 NOTE — DISCHARGE SUMMARY
Discharge Summary  Kate Veras    Admitting Dx:  1.  S/P Ectopic methotrexate  2.  Dehydration    Discharge Dx:  1.  Same/Resolve    Hospital Course:  This is a 28 year old female, with inappropriate rising beta HCG with  History of prior ectopic pregnancy.  After risks, benefits and alternatives  Reviewed elected for methotrexate.    Patient received methotrexate on weds, initially did well however seen  In the office on Friday with complaints of diarrhea and inability to tolerate  Little to no fluids since late Thursday evening.  Direct admission from the office for labs, IV antiemetics and aggressive  IVF hydration.  Patient responded well to supportive measures and on 5/19 requesting  Discharge home.  She was discharged home with zofran dot.  Scheduled fu in the office  For early this week.  Labs performed in the hospital were within normal ranges.  Beta HCG with appropriate rise (s/p MXT).

## 2018-05-19 NOTE — PLAN OF CARE
Problem: Patient Care Overview  Goal: Plan of Care Review  Outcome: Ongoing (interventions implemented as appropriate)   05/19/18 0406   Coping/Psychosocial   Plan of Care Reviewed With patient   Plan of Care Review   Progress improving   OTHER   Outcome Summary Pt with intermittent nausea and diarrhea, tolerating food, zofran given x1, up ad radhika, voiding, home today      Goal: Individualization and Mutuality  Outcome: Ongoing (interventions implemented as appropriate)    Goal: Discharge Needs Assessment  Outcome: Ongoing (interventions implemented as appropriate)    Goal: Interprofessional Rounds/Family Conf  Outcome: Ongoing (interventions implemented as appropriate)      Problem: Fluid Volume Deficit (Adult)  Goal: Identify Related Risk Factors and Signs and Symptoms  Outcome: Ongoing (interventions implemented as appropriate)    Goal: Optimal Fluid Balance  Outcome: Ongoing (interventions implemented as appropriate)

## 2019-10-22 ENCOUNTER — INITIAL PRENATAL (OUTPATIENT)
Dept: OBSTETRICS AND GYNECOLOGY | Facility: CLINIC | Age: 29
End: 2019-10-22

## 2019-10-22 ENCOUNTER — PROCEDURE VISIT (OUTPATIENT)
Dept: OBSTETRICS AND GYNECOLOGY | Facility: CLINIC | Age: 29
End: 2019-10-22

## 2019-10-22 VITALS — BODY MASS INDEX: 33.98 KG/M2 | DIASTOLIC BLOOD PRESSURE: 88 MMHG | SYSTOLIC BLOOD PRESSURE: 134 MMHG | WEIGHT: 174 LBS

## 2019-10-22 DIAGNOSIS — Z34.83 PRENATAL CARE, SUBSEQUENT PREGNANCY, THIRD TRIMESTER: ICD-10-CM

## 2019-10-22 DIAGNOSIS — Z78.9 NONSMOKER: ICD-10-CM

## 2019-10-22 DIAGNOSIS — O34.219 HISTORY OF CESAREAN DELIVERY, CURRENTLY PREGNANT: ICD-10-CM

## 2019-10-22 DIAGNOSIS — O36.63X0 EXCESSIVE FETAL GROWTH AFFECTING MANAGEMENT OF PREGNANCY IN THIRD TRIMESTER, SINGLE OR UNSPECIFIED FETUS: Primary | ICD-10-CM

## 2019-10-22 DIAGNOSIS — O24.410 DIET CONTROLLED GESTATIONAL DIABETES MELLITUS (GDM) IN THIRD TRIMESTER: Primary | ICD-10-CM

## 2019-10-22 PROCEDURE — 0502F SUBSEQUENT PRENATAL CARE: CPT | Performed by: OBSTETRICS & GYNECOLOGY

## 2019-10-22 PROCEDURE — 76816 OB US FOLLOW-UP PER FETUS: CPT | Performed by: OBSTETRICS & GYNECOLOGY

## 2019-10-22 RX ORDER — FERROUS SULFATE 325(65) MG
325 TABLET ORAL 3 TIMES DAILY
COMMUNITY
Start: 2019-10-10 | End: 2020-08-06

## 2019-10-22 NOTE — PROGRESS NOTES
[facility].  fsbs this morning was 78 fasing.  Postprandial have mostly been 120-130's.  Patient failed 1 hour GTT and decided to just treat this pregnancy like she had gestational diabetes since she had it in her previous pregnancy.  Patient had a degree in nutrition.  In addition, EFW 99.6% today.    Previous C/S, planning a repeat.  She does not desire salpingectomy.  Patient's first delivery was because she went into labor early.  Will plan repeat at 39 weeks.      Rash on abdomen, hydrocortisone and benadryl cream ok.    Patient had flu shot two weeks ago.  Reports she is Rubella non-immune, will get MMR in hospital after delivery    Schedule repeat C/S at next visit.

## 2019-11-05 ENCOUNTER — ROUTINE PRENATAL (OUTPATIENT)
Dept: OBSTETRICS AND GYNECOLOGY | Facility: CLINIC | Age: 29
End: 2019-11-05

## 2019-11-05 VITALS — SYSTOLIC BLOOD PRESSURE: 136 MMHG | DIASTOLIC BLOOD PRESSURE: 78 MMHG | WEIGHT: 177 LBS | BODY MASS INDEX: 34.57 KG/M2

## 2019-11-05 DIAGNOSIS — O24.415 ORAL HYPOGLYCEMIC CONTROLLED WHITE CLASSIFICATION A2 GESTATIONAL DIABETES MELLITUS (GDM): Primary | ICD-10-CM

## 2019-11-05 DIAGNOSIS — Z78.9 NONSMOKER: ICD-10-CM

## 2019-11-05 DIAGNOSIS — O34.219 HISTORY OF CESAREAN DELIVERY, CURRENTLY PREGNANT: ICD-10-CM

## 2019-11-05 DIAGNOSIS — O36.63X0 EXCESSIVE FETAL GROWTH AFFECTING MANAGEMENT OF PREGNANCY IN THIRD TRIMESTER, SINGLE OR UNSPECIFIED FETUS: ICD-10-CM

## 2019-11-05 PROCEDURE — 0502F SUBSEQUENT PRENATAL CARE: CPT | Performed by: OBSTETRICS & GYNECOLOGY

## 2019-11-05 RX ORDER — GLYBURIDE 5 MG/1
5 TABLET ORAL
Qty: 1 TABLET | Refills: 2 | Status: SHIPPED | OUTPATIENT
Start: 2019-11-05 | End: 2019-12-06 | Stop reason: HOSPADM

## 2019-11-05 RX ORDER — METHYLERGONOVINE MALEATE 0.2 MG/ML
200 INJECTION INTRAVENOUS ONCE AS NEEDED
Status: CANCELLED | OUTPATIENT
Start: 2019-11-05

## 2019-11-05 RX ORDER — SODIUM CHLORIDE 0.9 % (FLUSH) 0.9 %
3 SYRINGE (ML) INJECTION EVERY 12 HOURS SCHEDULED
Status: CANCELLED | OUTPATIENT
Start: 2019-11-05

## 2019-11-05 RX ORDER — CARBOPROST TROMETHAMINE 250 UG/ML
250 INJECTION, SOLUTION INTRAMUSCULAR AS NEEDED
Status: CANCELLED | OUTPATIENT
Start: 2019-11-05

## 2019-11-05 RX ORDER — SODIUM CHLORIDE 0.9 % (FLUSH) 0.9 %
10 SYRINGE (ML) INJECTION AS NEEDED
Status: CANCELLED | OUTPATIENT
Start: 2019-11-05

## 2019-11-05 RX ORDER — LIDOCAINE HYDROCHLORIDE 10 MG/ML
5 INJECTION, SOLUTION EPIDURAL; INFILTRATION; INTRACAUDAL; PERINEURAL AS NEEDED
Status: CANCELLED | OUTPATIENT
Start: 2019-11-05

## 2019-11-05 RX ORDER — MISOPROSTOL 100 UG/1
800 TABLET ORAL AS NEEDED
Status: CANCELLED | OUTPATIENT
Start: 2019-11-05

## 2019-11-05 NOTE — PROGRESS NOTES
Rash less-bothersome now.  She now reports some itching on chest and thighs, but there is no visible rash and she patches of pruritis are migratory.  Patient with mucus-like discharge and possible occasional contractions.  fsbs fasting is usually around 115.  Postprandial usually 120-130's.  Patient took glyburide with last pregnancy and wants to resume that; she declines insulin therapy.  Starting 2.5 mg qhs  Good FM.  Scheduling repeat C/S for Dec 19th  Patient with h/o PTL with first delivery at 35 weeks.  She was offered weekly Rosmery injections, but also cautiously advised against it by the NP in Js's office, who told her that stillbirth was one of the possible side effects.

## 2019-11-12 ENCOUNTER — ROUTINE PRENATAL (OUTPATIENT)
Dept: OBSTETRICS AND GYNECOLOGY | Facility: CLINIC | Age: 29
End: 2019-11-12

## 2019-11-12 ENCOUNTER — PROCEDURE VISIT (OUTPATIENT)
Dept: OBSTETRICS AND GYNECOLOGY | Facility: CLINIC | Age: 29
End: 2019-11-12

## 2019-11-12 VITALS — BODY MASS INDEX: 34.96 KG/M2 | SYSTOLIC BLOOD PRESSURE: 124 MMHG | DIASTOLIC BLOOD PRESSURE: 90 MMHG | WEIGHT: 179 LBS

## 2019-11-12 DIAGNOSIS — Z78.9 NONSMOKER: ICD-10-CM

## 2019-11-12 DIAGNOSIS — O34.219 HISTORY OF CESAREAN DELIVERY, CURRENTLY PREGNANT: ICD-10-CM

## 2019-11-12 DIAGNOSIS — O24.410 DIET CONTROLLED GESTATIONAL DIABETES MELLITUS (GDM) IN THIRD TRIMESTER: Primary | ICD-10-CM

## 2019-11-12 DIAGNOSIS — L29.9 SEVERE ITCHING: ICD-10-CM

## 2019-11-12 DIAGNOSIS — O24.415 ORAL HYPOGLYCEMIC CONTROLLED WHITE CLASSIFICATION A2 GESTATIONAL DIABETES MELLITUS (GDM): Primary | ICD-10-CM

## 2019-11-12 DIAGNOSIS — Z34.83 PRENATAL CARE, SUBSEQUENT PREGNANCY, THIRD TRIMESTER: ICD-10-CM

## 2019-11-12 PROCEDURE — 76819 FETAL BIOPHYS PROFIL W/O NST: CPT | Performed by: OBSTETRICS & GYNECOLOGY

## 2019-11-12 PROCEDURE — 0502F SUBSEQUENT PRENATAL CARE: CPT | Performed by: OBSTETRICS & GYNECOLOGY

## 2019-11-12 NOTE — PROGRESS NOTES
Patient still c/o pruritic rash.  Now her belly, hands, arms and feet are all affected.  She does have visible erythematous rash on belly, although some of it may be from scratching.  Fasting fsbs lowest has been 75, but usually low 100's.  Currently taking 2.5 mg glyburide at night.  Postprandial fsbs are usually about 135.  Good FM.  BPP 8/8 today.  GWENDOLYN 13.1 cm  Checking bile salt acids today.

## 2019-11-15 LAB
BILE AC SERPL-SCNC: 1.3 UMOL/L
CDCAE SERPL-SCNC: 0.4 UMOL/L
CHOLATE SERPL-SCNC: 0.3 UMOL/L
DO-CHOLATE SERPL-SCNC: 0.6 UMOL/L
URSODEOXYCHOLATE SERPL-SCNC: <0.1 UMOL/L

## 2019-11-18 NOTE — PROGRESS NOTES
Please let Kate know that her bile salt acids were normal.  She can continue using benadryl and hydrocortisone for rash/itching.  Thx

## 2019-11-19 ENCOUNTER — PROCEDURE VISIT (OUTPATIENT)
Dept: OBSTETRICS AND GYNECOLOGY | Facility: CLINIC | Age: 29
End: 2019-11-19

## 2019-11-19 ENCOUNTER — ROUTINE PRENATAL (OUTPATIENT)
Dept: OBSTETRICS AND GYNECOLOGY | Facility: CLINIC | Age: 29
End: 2019-11-19

## 2019-11-19 VITALS — WEIGHT: 181 LBS | DIASTOLIC BLOOD PRESSURE: 98 MMHG | SYSTOLIC BLOOD PRESSURE: 142 MMHG | BODY MASS INDEX: 35.35 KG/M2

## 2019-11-19 DIAGNOSIS — Z78.9 NONSMOKER: ICD-10-CM

## 2019-11-19 DIAGNOSIS — O13.3 PREGNANCY-INDUCED HYPERTENSION IN THIRD TRIMESTER: ICD-10-CM

## 2019-11-19 DIAGNOSIS — O24.415 ORAL HYPOGLYCEMIC CONTROLLED WHITE CLASSIFICATION A2 GESTATIONAL DIABETES MELLITUS (GDM): Primary | ICD-10-CM

## 2019-11-19 DIAGNOSIS — O34.219 HISTORY OF CESAREAN DELIVERY, CURRENTLY PREGNANT: ICD-10-CM

## 2019-11-19 PROCEDURE — 0502F SUBSEQUENT PRENATAL CARE: CPT | Performed by: OBSTETRICS & GYNECOLOGY

## 2019-11-19 PROCEDURE — 76819 FETAL BIOPHYS PROFIL W/O NST: CPT | Performed by: OBSTETRICS & GYNECOLOGY

## 2019-11-19 NOTE — PROGRESS NOTES
EFW 95%, BPP 8/8, GWENDOLYN 12.  Still with pruritis, bile salt acids were normal  Occasional ctx.  No LOF or VB  Fasting fsbs 80's, after meals 130's and 140's  Trace pedal edema.  /98, with 140/100 on re-take.  No HA or vision changes.  No N/V, No RUQ pain.  Will start 24 hour urine collection and have patient back in office in two days

## 2019-11-21 ENCOUNTER — ROUTINE PRENATAL (OUTPATIENT)
Dept: OBSTETRICS AND GYNECOLOGY | Facility: CLINIC | Age: 29
End: 2019-11-21

## 2019-11-21 VITALS — DIASTOLIC BLOOD PRESSURE: 98 MMHG | WEIGHT: 179 LBS | BODY MASS INDEX: 34.96 KG/M2 | SYSTOLIC BLOOD PRESSURE: 140 MMHG

## 2019-11-21 DIAGNOSIS — O34.219 HISTORY OF CESAREAN DELIVERY, CURRENTLY PREGNANT: ICD-10-CM

## 2019-11-21 DIAGNOSIS — Z78.9 NONSMOKER: ICD-10-CM

## 2019-11-21 DIAGNOSIS — O13.3 PREGNANCY-INDUCED HYPERTENSION IN THIRD TRIMESTER: Primary | ICD-10-CM

## 2019-11-21 PROCEDURE — 0502F SUBSEQUENT PRENATAL CARE: CPT | Performed by: OBSTETRICS & GYNECOLOGY

## 2019-11-21 RX ORDER — LABETALOL 100 MG/1
200 TABLET, FILM COATED ORAL 2 TIMES DAILY
Qty: 120 TABLET | Refills: 1 | Status: SHIPPED | OUTPATIENT
Start: 2019-11-21 | End: 2020-01-16

## 2019-11-21 NOTE — PROGRESS NOTES
"Patient \"feeling pretty good\".  She denies HA or vision changes.  No RUQ pain or N/V  BP at home has been 140's/90's or 100's every time she has checked.  She returned 24 hour urine today.  No ctx.  Good FM  Face appears more round today, but only trace pedal edema  "

## 2019-11-22 LAB
ALBUMIN SERPL-MCNC: 3.4 G/DL (ref 3.5–5.2)
ALBUMIN/GLOB SERPL: 1.4 G/DL
ALP SERPL-CCNC: 125 U/L (ref 39–117)
ALT SERPL-CCNC: 9 U/L (ref 1–33)
AST SERPL-CCNC: 13 U/L (ref 1–32)
BASOPHILS # BLD AUTO: 0.03 10*3/MM3 (ref 0–0.2)
BASOPHILS NFR BLD AUTO: 0.5 % (ref 0–1.5)
BILIRUB SERPL-MCNC: <0.2 MG/DL (ref 0.2–1.2)
BUN SERPL-MCNC: 10 MG/DL (ref 6–20)
BUN/CREAT SERPL: 20.4 (ref 7–25)
CALCIUM SERPL-MCNC: 8.8 MG/DL (ref 8.6–10.5)
CHLORIDE SERPL-SCNC: 107 MMOL/L (ref 98–107)
CO2 SERPL-SCNC: 22.5 MMOL/L (ref 22–29)
CREAT SERPL-MCNC: 0.49 MG/DL (ref 0.57–1)
EOSINOPHIL # BLD AUTO: 0.07 10*3/MM3 (ref 0–0.4)
EOSINOPHIL NFR BLD AUTO: 1.1 % (ref 0.3–6.2)
ERYTHROCYTE [DISTWIDTH] IN BLOOD BY AUTOMATED COUNT: 13.4 % (ref 12.3–15.4)
GLOBULIN SER CALC-MCNC: 2.4 GM/DL
GLUCOSE SERPL-MCNC: 79 MG/DL (ref 65–99)
HCT VFR BLD AUTO: 31.1 % (ref 34–46.6)
HGB BLD-MCNC: 10.3 G/DL (ref 12–15.9)
IMM GRANULOCYTES # BLD AUTO: 0.1 10*3/MM3 (ref 0–0.05)
IMM GRANULOCYTES NFR BLD AUTO: 1.5 % (ref 0–0.5)
LYMPHOCYTES # BLD AUTO: 1.29 10*3/MM3 (ref 0.7–3.1)
LYMPHOCYTES NFR BLD AUTO: 19.4 % (ref 19.6–45.3)
MCH RBC QN AUTO: 30.5 PG (ref 26.6–33)
MCHC RBC AUTO-ENTMCNC: 33.1 G/DL (ref 31.5–35.7)
MCV RBC AUTO: 92 FL (ref 79–97)
MONOCYTES # BLD AUTO: 0.33 10*3/MM3 (ref 0.1–0.9)
MONOCYTES NFR BLD AUTO: 5 % (ref 5–12)
NEUTROPHILS # BLD AUTO: 4.82 10*3/MM3 (ref 1.7–7)
NEUTROPHILS NFR BLD AUTO: 72.5 % (ref 42.7–76)
NRBC BLD AUTO-RTO: 0 /100 WBC (ref 0–0.2)
PLATELET # BLD AUTO: 158 10*3/MM3 (ref 140–450)
POTASSIUM SERPL-SCNC: 5.3 MMOL/L (ref 3.5–5.2)
PROT 24H UR-MRATE: 405 MG/24HOURS (ref 0–150)
PROT SERPL-MCNC: 5.8 G/DL (ref 6–8.5)
PROT UR-MCNC: 27 MG/DL
RBC # BLD AUTO: 3.38 10*6/MM3 (ref 3.77–5.28)
SODIUM SERPL-SCNC: 138 MMOL/L (ref 136–145)
URATE SERPL-MCNC: 4.8 MG/DL (ref 2.4–5.7)
WBC # BLD AUTO: 6.64 10*3/MM3 (ref 3.4–10.8)

## 2019-11-26 ENCOUNTER — ROUTINE PRENATAL (OUTPATIENT)
Dept: OBSTETRICS AND GYNECOLOGY | Facility: CLINIC | Age: 29
End: 2019-11-26

## 2019-11-26 ENCOUNTER — PROCEDURE VISIT (OUTPATIENT)
Dept: OBSTETRICS AND GYNECOLOGY | Facility: CLINIC | Age: 29
End: 2019-11-26

## 2019-11-26 VITALS — BODY MASS INDEX: 34.76 KG/M2 | WEIGHT: 178 LBS | DIASTOLIC BLOOD PRESSURE: 102 MMHG | SYSTOLIC BLOOD PRESSURE: 152 MMHG

## 2019-11-26 DIAGNOSIS — Z34.93 PRENATAL CARE IN THIRD TRIMESTER: Primary | ICD-10-CM

## 2019-11-26 DIAGNOSIS — O24.415 ORAL HYPOGLYCEMIC CONTROLLED WHITE CLASSIFICATION A2 GESTATIONAL DIABETES MELLITUS (GDM): Primary | ICD-10-CM

## 2019-11-26 PROCEDURE — 0502F SUBSEQUENT PRENATAL CARE: CPT | Performed by: NURSE PRACTITIONER

## 2019-11-26 PROCEDURE — 76819 FETAL BIOPHYS PROFIL W/O NST: CPT | Performed by: OBSTETRICS & GYNECOLOGY

## 2019-11-26 NOTE — PROGRESS NOTES
Denies HA or vision changes. No RUQ pain or N/V.  No UCs.  Good FM.  BPP 8/8 today.  BP at home has been 140s to 150s over 90s to 100s on most days - worse in the evenings.  To increase labetalol to one in the morning and two in the evening.  Reviewed 24-hour urine results.  Patient aware of CS now 12/5/19 with prework the day before. PTL precautions.  [facility].

## 2019-12-03 ENCOUNTER — ROUTINE PRENATAL (OUTPATIENT)
Dept: OBSTETRICS AND GYNECOLOGY | Facility: CLINIC | Age: 29
End: 2019-12-03

## 2019-12-03 ENCOUNTER — PROCEDURE VISIT (OUTPATIENT)
Dept: OBSTETRICS AND GYNECOLOGY | Facility: CLINIC | Age: 29
End: 2019-12-03

## 2019-12-03 VITALS — DIASTOLIC BLOOD PRESSURE: 86 MMHG | WEIGHT: 177 LBS | BODY MASS INDEX: 34.57 KG/M2 | SYSTOLIC BLOOD PRESSURE: 146 MMHG

## 2019-12-03 DIAGNOSIS — Z34.93 PRENATAL CARE IN THIRD TRIMESTER: ICD-10-CM

## 2019-12-03 DIAGNOSIS — O34.219 HISTORY OF CESAREAN DELIVERY, CURRENTLY PREGNANT: ICD-10-CM

## 2019-12-03 DIAGNOSIS — Z78.9 NONSMOKER: ICD-10-CM

## 2019-12-03 DIAGNOSIS — O24.410 DIET CONTROLLED GESTATIONAL DIABETES MELLITUS (GDM) IN THIRD TRIMESTER: Primary | ICD-10-CM

## 2019-12-03 PROCEDURE — 59426 ANTEPARTUM CARE ONLY: CPT | Performed by: OBSTETRICS & GYNECOLOGY

## 2019-12-03 PROCEDURE — 76819 FETAL BIOPHYS PROFIL W/O NST: CPT | Performed by: OBSTETRICS & GYNECOLOGY

## 2019-12-03 NOTE — PROGRESS NOTES
Patient reports she is feeling well.  No swelling, no HA or vision changes.  No RUQ pain, no N/V.  Patient with C/S in two days for mild preeclampsia.  BPP today 8/8, GWENDOLYN 16 cm.  Good FM.  BP at home has been a little better than it is here.  Will take 200 mg labetalol bid for next two day until delivery.

## 2019-12-04 ENCOUNTER — HOSPITAL ENCOUNTER (INPATIENT)
Facility: HOSPITAL | Age: 29
LOS: 2 days | Discharge: HOME | End: 2019-12-06
Attending: OBSTETRICS & GYNECOLOGY | Admitting: OBSTETRICS & GYNECOLOGY

## 2019-12-04 ENCOUNTER — ANESTHESIA EVENT (OUTPATIENT)
Dept: LABOR AND DELIVERY | Facility: HOSPITAL | Age: 29
End: 2019-12-04

## 2019-12-04 ENCOUNTER — TELEPHONE (OUTPATIENT)
Dept: OBSTETRICS AND GYNECOLOGY | Facility: CLINIC | Age: 29
End: 2019-12-04

## 2019-12-04 ENCOUNTER — APPOINTMENT (OUTPATIENT)
Dept: PREADMISSION TESTING | Facility: HOSPITAL | Age: 29
End: 2019-12-04

## 2019-12-04 ENCOUNTER — ANESTHESIA (OUTPATIENT)
Dept: LABOR AND DELIVERY | Facility: HOSPITAL | Age: 29
End: 2019-12-04

## 2019-12-04 VITALS
HEIGHT: 60 IN | SYSTOLIC BLOOD PRESSURE: 150 MMHG | WEIGHT: 180.34 LBS | BODY MASS INDEX: 35.41 KG/M2 | OXYGEN SATURATION: 98 % | HEART RATE: 84 BPM | DIASTOLIC BLOOD PRESSURE: 86 MMHG

## 2019-12-04 PROBLEM — O34.219 HISTORY OF CESAREAN DELIVERY, CURRENTLY PREGNANT: Status: RESOLVED | Noted: 2018-05-15 | Resolved: 2019-12-04

## 2019-12-04 LAB
A1 MICROGLOB PLACENTAL VAG QL: POSITIVE
ABO GROUP BLD: NORMAL
ANION GAP SERPL CALCULATED.3IONS-SCNC: 12 MMOL/L (ref 5–15)
BLD GP AB SCN SERPL QL: NEGATIVE
BUN BLD-MCNC: 13 MG/DL (ref 6–20)
BUN/CREAT SERPL: 25.5 (ref 7–25)
CALCIUM SPEC-SCNC: 9.5 MG/DL (ref 8.6–10.5)
CHLORIDE SERPL-SCNC: 103 MMOL/L (ref 98–107)
CO2 SERPL-SCNC: 22 MMOL/L (ref 22–29)
CREAT BLD-MCNC: 0.51 MG/DL (ref 0.57–1)
DEPRECATED RDW RBC AUTO: 43.7 FL (ref 37–54)
ERYTHROCYTE [DISTWIDTH] IN BLOOD BY AUTOMATED COUNT: 13.3 % (ref 12.3–15.4)
GFR SERPL CREATININE-BSD FRML MDRD: 142 ML/MIN/1.73
GLUCOSE BLD-MCNC: 162 MG/DL (ref 65–99)
HCT VFR BLD AUTO: 31.1 % (ref 34–46.6)
HGB BLD-MCNC: 10.7 G/DL (ref 12–15.9)
MCH RBC QN AUTO: 30.6 PG (ref 26.6–33)
MCHC RBC AUTO-ENTMCNC: 34.4 G/DL (ref 31.5–35.7)
MCV RBC AUTO: 88.9 FL (ref 79–97)
PLATELET # BLD AUTO: 140 10*3/MM3 (ref 140–450)
PMV BLD AUTO: 12.1 FL (ref 6–12)
POTASSIUM BLD-SCNC: 4.1 MMOL/L (ref 3.5–5.2)
RBC # BLD AUTO: 3.5 10*6/MM3 (ref 3.77–5.28)
RH BLD: POSITIVE
SODIUM BLD-SCNC: 137 MMOL/L (ref 136–145)
T&S EXPIRATION DATE: NORMAL
WBC NRBC COR # BLD: 5.31 10*3/MM3 (ref 3.4–10.8)

## 2019-12-04 PROCEDURE — 36415 COLL VENOUS BLD VENIPUNCTURE: CPT

## 2019-12-04 PROCEDURE — 84112 EVAL AMNIOTIC FLUID PROTEIN: CPT | Performed by: OBSTETRICS & GYNECOLOGY

## 2019-12-04 PROCEDURE — 86900 BLOOD TYPING SEROLOGIC ABO: CPT | Performed by: OBSTETRICS & GYNECOLOGY

## 2019-12-04 PROCEDURE — 0HQ9XZZ REPAIR PERINEUM SKIN, EXTERNAL APPROACH: ICD-10-PCS | Performed by: OBSTETRICS & GYNECOLOGY

## 2019-12-04 PROCEDURE — 85027 COMPLETE CBC AUTOMATED: CPT | Performed by: OBSTETRICS & GYNECOLOGY

## 2019-12-04 PROCEDURE — 86850 RBC ANTIBODY SCREEN: CPT | Performed by: OBSTETRICS & GYNECOLOGY

## 2019-12-04 PROCEDURE — 80048 BASIC METABOLIC PNL TOTAL CA: CPT | Performed by: ANESTHESIOLOGY

## 2019-12-04 PROCEDURE — 86901 BLOOD TYPING SEROLOGIC RH(D): CPT | Performed by: OBSTETRICS & GYNECOLOGY

## 2019-12-04 PROCEDURE — 88307 TISSUE EXAM BY PATHOLOGIST: CPT | Performed by: OBSTETRICS & GYNECOLOGY

## 2019-12-04 RX ORDER — DOCUSATE SODIUM 100 MG/1
100 CAPSULE, LIQUID FILLED ORAL 2 TIMES DAILY PRN
Status: DISCONTINUED | OUTPATIENT
Start: 2019-12-04 | End: 2019-12-06 | Stop reason: HOSPADM

## 2019-12-04 RX ORDER — LABETALOL 200 MG/1
200 TABLET, FILM COATED ORAL 2 TIMES DAILY
Status: DISCONTINUED | OUTPATIENT
Start: 2019-12-04 | End: 2019-12-06 | Stop reason: HOSPADM

## 2019-12-04 RX ORDER — BISACODYL 10 MG
10 SUPPOSITORY, RECTAL RECTAL DAILY PRN
Status: DISCONTINUED | OUTPATIENT
Start: 2019-12-05 | End: 2019-12-06 | Stop reason: HOSPADM

## 2019-12-04 RX ORDER — SODIUM CHLORIDE 0.9 % (FLUSH) 0.9 %
3 SYRINGE (ML) INJECTION EVERY 12 HOURS SCHEDULED
Status: DISCONTINUED | OUTPATIENT
Start: 2019-12-04 | End: 2019-12-04 | Stop reason: HOSPADM

## 2019-12-04 RX ORDER — CARBOPROST TROMETHAMINE 250 UG/ML
250 INJECTION, SOLUTION INTRAMUSCULAR AS NEEDED
Status: CANCELLED | OUTPATIENT
Start: 2019-12-04

## 2019-12-04 RX ORDER — PROMETHAZINE HYDROCHLORIDE 25 MG/ML
12.5 INJECTION, SOLUTION INTRAMUSCULAR; INTRAVENOUS EVERY 6 HOURS PRN
Status: DISCONTINUED | OUTPATIENT
Start: 2019-12-04 | End: 2019-12-06 | Stop reason: HOSPADM

## 2019-12-04 RX ORDER — OXYTOCIN/0.9 % SODIUM CHLORIDE 30/500 ML
250 PLASTIC BAG, INJECTION (ML) INTRAVENOUS CONTINUOUS
Status: DISPENSED | OUTPATIENT
Start: 2019-12-04 | End: 2019-12-04

## 2019-12-04 RX ORDER — SODIUM CHLORIDE, SODIUM LACTATE, POTASSIUM CHLORIDE, CALCIUM CHLORIDE 600; 310; 30; 20 MG/100ML; MG/100ML; MG/100ML; MG/100ML
125 INJECTION, SOLUTION INTRAVENOUS CONTINUOUS
Status: DISCONTINUED | OUTPATIENT
Start: 2019-12-04 | End: 2019-12-04

## 2019-12-04 RX ORDER — OXYTOCIN/0.9 % SODIUM CHLORIDE 30/500 ML
125 PLASTIC BAG, INJECTION (ML) INTRAVENOUS CONTINUOUS PRN
Status: CANCELLED | OUTPATIENT
Start: 2019-12-04

## 2019-12-04 RX ORDER — MISOPROSTOL 200 UG/1
800 TABLET ORAL AS NEEDED
Status: CANCELLED | OUTPATIENT
Start: 2019-12-04

## 2019-12-04 RX ORDER — IBUPROFEN 600 MG/1
600 TABLET ORAL EVERY 8 HOURS PRN
Status: DISCONTINUED | OUTPATIENT
Start: 2019-12-04 | End: 2019-12-06 | Stop reason: HOSPADM

## 2019-12-04 RX ORDER — METHYLERGONOVINE MALEATE 0.2 MG/ML
200 INJECTION INTRAVENOUS ONCE AS NEEDED
Status: CANCELLED | OUTPATIENT
Start: 2019-12-04

## 2019-12-04 RX ORDER — SODIUM CHLORIDE 0.9 % (FLUSH) 0.9 %
3-10 SYRINGE (ML) INJECTION AS NEEDED
Status: DISCONTINUED | OUTPATIENT
Start: 2019-12-04 | End: 2019-12-04 | Stop reason: HOSPADM

## 2019-12-04 RX ORDER — ONDANSETRON 4 MG/1
4 TABLET, FILM COATED ORAL EVERY 6 HOURS PRN
Status: DISCONTINUED | OUTPATIENT
Start: 2019-12-04 | End: 2019-12-06 | Stop reason: HOSPADM

## 2019-12-04 RX ORDER — HYDROCODONE BITARTRATE AND ACETAMINOPHEN 7.5; 325 MG/1; MG/1
1 TABLET ORAL EVERY 4 HOURS PRN
Status: DISCONTINUED | OUTPATIENT
Start: 2019-12-04 | End: 2019-12-06 | Stop reason: HOSPADM

## 2019-12-04 RX ORDER — OXYTOCIN/0.9 % SODIUM CHLORIDE 30/500 ML
999 PLASTIC BAG, INJECTION (ML) INTRAVENOUS ONCE
Status: CANCELLED | OUTPATIENT
Start: 2019-12-04

## 2019-12-04 RX ORDER — OXYTOCIN/0.9 % SODIUM CHLORIDE 30/500 ML
250 PLASTIC BAG, INJECTION (ML) INTRAVENOUS CONTINUOUS
Status: CANCELLED | OUTPATIENT
Start: 2019-12-04 | End: 2019-12-04

## 2019-12-04 RX ORDER — SODIUM CHLORIDE 0.9 % (FLUSH) 0.9 %
1-10 SYRINGE (ML) INJECTION AS NEEDED
Status: DISCONTINUED | OUTPATIENT
Start: 2019-12-04 | End: 2019-12-06 | Stop reason: HOSPADM

## 2019-12-04 RX ORDER — ONDANSETRON 2 MG/ML
4 INJECTION INTRAMUSCULAR; INTRAVENOUS EVERY 6 HOURS PRN
Status: DISCONTINUED | OUTPATIENT
Start: 2019-12-04 | End: 2019-12-06 | Stop reason: HOSPADM

## 2019-12-04 RX ORDER — PROMETHAZINE HYDROCHLORIDE 25 MG/1
25 TABLET ORAL EVERY 6 HOURS PRN
Status: DISCONTINUED | OUTPATIENT
Start: 2019-12-04 | End: 2019-12-06 | Stop reason: HOSPADM

## 2019-12-04 RX ORDER — OXYTOCIN/0.9 % SODIUM CHLORIDE 30/500 ML
999 PLASTIC BAG, INJECTION (ML) INTRAVENOUS ONCE
Status: COMPLETED | OUTPATIENT
Start: 2019-12-04 | End: 2019-12-04

## 2019-12-04 RX ORDER — LIDOCAINE HYDROCHLORIDE 10 MG/ML
5 INJECTION, SOLUTION EPIDURAL; INFILTRATION; INTRACAUDAL; PERINEURAL AS NEEDED
Status: DISCONTINUED | OUTPATIENT
Start: 2019-12-04 | End: 2019-12-04 | Stop reason: HOSPADM

## 2019-12-04 RX ORDER — PROMETHAZINE HYDROCHLORIDE 12.5 MG/1
12.5 SUPPOSITORY RECTAL EVERY 6 HOURS PRN
Status: DISCONTINUED | OUTPATIENT
Start: 2019-12-04 | End: 2019-12-06 | Stop reason: HOSPADM

## 2019-12-04 RX ORDER — DIPHENHYDRAMINE HCL 25 MG
25 CAPSULE ORAL NIGHTLY PRN
Status: DISCONTINUED | OUTPATIENT
Start: 2019-12-04 | End: 2019-12-06 | Stop reason: HOSPADM

## 2019-12-04 RX ORDER — OXYTOCIN/0.9 % SODIUM CHLORIDE 30/500 ML
125 PLASTIC BAG, INJECTION (ML) INTRAVENOUS CONTINUOUS PRN
Status: COMPLETED | OUTPATIENT
Start: 2019-12-04 | End: 2019-12-04

## 2019-12-04 RX ORDER — CALCIUM CARBONATE 200(500)MG
2 TABLET,CHEWABLE ORAL 3 TIMES DAILY PRN
Status: DISCONTINUED | OUTPATIENT
Start: 2019-12-04 | End: 2019-12-06 | Stop reason: HOSPADM

## 2019-12-04 RX ORDER — PRENATAL VIT/IRON FUM/FOLIC AC 27MG-0.8MG
1 TABLET ORAL DAILY
Status: DISCONTINUED | OUTPATIENT
Start: 2019-12-05 | End: 2019-12-06 | Stop reason: HOSPADM

## 2019-12-04 RX ADMIN — OXYTOCIN-SODIUM CHLORIDE 0.9% IV SOLN 30 UNIT/500ML 250 ML/HR: 30-0.9/5 SOLUTION at 20:05

## 2019-12-04 RX ADMIN — SODIUM CHLORIDE, POTASSIUM CHLORIDE, SODIUM LACTATE AND CALCIUM CHLORIDE 125 ML/HR: 600; 310; 30; 20 INJECTION, SOLUTION INTRAVENOUS at 18:31

## 2019-12-04 RX ADMIN — IBUPROFEN 600 MG: 600 TABLET ORAL at 22:40

## 2019-12-04 RX ADMIN — LABETALOL HCL 200 MG: 200 TABLET, FILM COATED ORAL at 22:56

## 2019-12-04 RX ADMIN — OXYTOCIN-SODIUM CHLORIDE 0.9% IV SOLN 30 UNIT/500ML 999 ML/HR: 30-0.9/5 SOLUTION at 19:43

## 2019-12-04 RX ADMIN — OXYTOCIN-SODIUM CHLORIDE 0.9% IV SOLN 30 UNIT/500ML 125 ML/HR: 30-0.9/5 SOLUTION at 21:34

## 2019-12-04 NOTE — TELEPHONE ENCOUNTER
Pt calls stating she is scheduled for  in am.  She states she started having low back and pelvic pain about 2 hrs ago and contractions every 4-5 minutes.  Good fetal movement.  Recommended if contractions continue for her to go to L&D for monitoring this evening.  She verbalizes understanding.

## 2019-12-04 NOTE — DISCHARGE INSTRUCTIONS
ON THE DAY OF YOUR  SECTION GO DIRECTLY TO THE LABOR AND DELIVERY DEPARTMENT      MANAGING PAIN AFTER SURGERY    We know you are probably wondering what your pain will be like after surgery.  Following surgery it is unrealistic to expect you will not have pain.   Pain is how our bodies let us know that something is wrong or cautions us to be careful.  That said, our goal is to make your pain tolerable.    Methods we may use to treat your pain include (oral or IV medications, PCAs, epidurals, nerve blocks, etc.)   While some procedures require IV pain medications for a short time after surgery, transitioning to pain medications by mouth allows for better management of pain.   Your nurse will encourage you to take oral pain medications whenever possible.  IV medications work almost immediately, but only last a short while.  Taking medications by mouth allows for a more constant level of medication in your blood stream for a longer period of time.      Once your pain is out of control it is harder to get back under control.  It is important you are aware when your next dose of pain medication is due.  If you are admitted, your nurse may write the time of your next dose on the white board in your room to help you remember.      We are interested in your pain and encourage you to inform us about aggravating factors during your visit.   Many times a simple repositioning every few hours can make a big difference.    If your physician says it is okay, do not let your pain prevent you from getting out of bed. Be sure to call your nurse for assistance prior to getting up so you do not fall.      Before surgery, please decide your tolerable pain goal.  These faces help describe the pain ratings we use on a 0-10 scale.   Be prepared to tell us your goal and whether or not you take pain or anxiety medications at home.                            Spinal Anesthesia and Epidural Anesthesia  Spinal anesthesia and  epidural anesthesia are ways to numb a part of the body. They are often used:  · During childbirth.  · During surgery on certain parts of the body. These include:  ? Hip.  ? Pelvis.  ? Legs.  ? Lower belly (abdomen).  · After surgery on certain parts of the body. These include:  ? Belly.  ? Chest.  ?     NOTHING TO EAT OR DRINK AFTER MIDNIGHT.      Medicine   Ask your doctor about:  · Changing or stopping your regular medicines. This is especially important if you are taking diabetes medicines or blood thinners.  · Changing or stopping your dietary supplements.  · Taking medicines such as aspirin and ibuprofen. These medicines can thin your blood. Do not take these medicines before your procedure if your doctor tells you not to.  General instructions    · Do not use any tobacco products for as long as possible.  ? Examples of these are cigarettes, chewing tobacco, and e-cigarettes.  ? If you need help quitting, ask your doctor.  · Ask your doctor if you will have to stay overnight at the hospital or clinic.  · If you will not have to stay overnight:  ? Plan to have someone take you home.  ? Plan to have someone with you for 24 hours.  What happens during the procedure?  · A doctor will put patches on your chest, a cuff around your arm, or a sensor device on your finger. They will be connected to monitors.  · An IV tube may be put into one of your veins. This tube is used to give fluids and medicines.  · You may be given a medicine to help you relax (sedative).  · You may be asked to:  ? Sit up.  ? Lie on your side.  ? Bend your knees and chin toward your chest.  · An area of your back will be cleaned.  · You may get a shot of medicine in your back. The medicine will help prevent pain from the shot of numbing medicine.  · A needle will be put between the bones of your back. While this is being done:  ? Breathe normally.  ? Try not to move.  ? Stay as quiet as you can.  ? Tell your doctor if you feel a tingling shock  or pain going down your leg.  · You will get the shot of numbing medicine.  · If you need more medicine, a tube (catheter) may be put in the place where you got the shot. The tube will be used to give you more medicine. It will be left in if you need pain medicine after the procedure.  · A bandage (dressing) may be put on your back.  The procedure may vary among doctors and hospitals.  What happens after the procedure?  · Stay in bed until your doctor says it is safe to walk.  · Your doctors will check on you often until the medicines wear off.  · If there is a tube in your back, it will be taken out when it is no longer needed.  · Do not drive for 24 hours if you were given a medicine to help you relax (sedative).  · It is common to feel sick to your stomach (nauseous) and itchy. There are medicines that can help. It is also common to:  ? Be sleepy.  ? Throw up (vomit).  ? Have numbness or tingling in your legs.  ? Have trouble peeing (urinating).  This information is not intended to replace advice given to you by your health care provider. Make sure you discuss any questions you have with your health care provider.  Document Released: 04/10/2017 Document Revised: 05/30/2017 Document Reviewed: 04/10/2017  [employer] Interactive Patient Education © 2017 [employer]         [facility]  CHG 4% Patient Instruction Sheet     Preparing the Skin Before Surgery  Preparing or “prepping” skin before surgery can reduce the risk of infection at the surgical site. To make the process easier,[facility] has chosen 4% Chlorhexidine Gluconate (CHG) antiseptic solution.   The steps below outline the prepping process and should be carefully followed.                                                                                                                                                      Prep the skin at the following time(s):                                                                 We recommend you shower the  night before surgery, and again the morning of surgery with the 4% CHG antiseptic solution using half of the bottle and a cloth each time.  Dress in clean clothes/sleepwear after showering.  See instructions below for application.          Do not apply any lotions or moisturizers.       Do not shave the area to be prepped for at least 2 days prior to surgery.    Clipping the hair may be done immediately prior to your surgery at the hospital    if needed.     Directions:  Thoroughly rinse your body with water.  Apply 4% CHG to a cloth and wash skin gently, paying special attention to the operative site AVOID BREASTS AND GENITAL AREAS.  Rinse again thoroughly.  Once you have begun using this product do not apply anything else to your skin. If itching or redness persists, rinse affected areas and discontinue use.     When using this product:  ·         Keep out of eyes, ears, and mouth.  ·         If solution should contact these areas, rinse out promptly and thoroughly with water.  ·         For external use only.  ·         Do not use in genital area, on your face or head.   Do not use on breast area - wash surgical site areaPATIENT/FAMILY/RESPONSIBLE PARTY VERBALIZES UNDERSTANDING OF ABOVE EDUCATION.  COPY OF PAIN SCALE GIVEN AND REVIEWED WITH VERBALIZED UNDERSTANDING.

## 2019-12-05 LAB
BASOPHILS # BLD AUTO: 0.02 10*3/MM3 (ref 0–0.2)
BASOPHILS NFR BLD AUTO: 0.2 % (ref 0–1.5)
DEPRECATED RDW RBC AUTO: 44.6 FL (ref 37–54)
EOSINOPHIL # BLD AUTO: 0.03 10*3/MM3 (ref 0–0.4)
EOSINOPHIL NFR BLD AUTO: 0.3 % (ref 0.3–6.2)
ERYTHROCYTE [DISTWIDTH] IN BLOOD BY AUTOMATED COUNT: 13.4 % (ref 12.3–15.4)
HCT VFR BLD AUTO: 27.7 % (ref 34–46.6)
HGB BLD-MCNC: 9.3 G/DL (ref 12–15.9)
IMM GRANULOCYTES # BLD AUTO: 0.04 10*3/MM3 (ref 0–0.05)
IMM GRANULOCYTES NFR BLD AUTO: 0.4 % (ref 0–0.5)
LYMPHOCYTES # BLD AUTO: 1.35 10*3/MM3 (ref 0.7–3.1)
LYMPHOCYTES NFR BLD AUTO: 14.4 % (ref 19.6–45.3)
MCH RBC QN AUTO: 30.8 PG (ref 26.6–33)
MCHC RBC AUTO-ENTMCNC: 33.6 G/DL (ref 31.5–35.7)
MCV RBC AUTO: 91.7 FL (ref 79–97)
MONOCYTES # BLD AUTO: 0.54 10*3/MM3 (ref 0.1–0.9)
MONOCYTES NFR BLD AUTO: 5.8 % (ref 5–12)
NEUTROPHILS # BLD AUTO: 7.38 10*3/MM3 (ref 1.7–7)
NEUTROPHILS NFR BLD AUTO: 78.9 % (ref 42.7–76)
NRBC BLD AUTO-RTO: 0 /100 WBC (ref 0–0.2)
PLATELET # BLD AUTO: 151 10*3/MM3 (ref 140–450)
PMV BLD AUTO: 11.8 FL (ref 6–12)
RBC # BLD AUTO: 3.02 10*6/MM3 (ref 3.77–5.28)
WBC NRBC COR # BLD: 9.36 10*3/MM3 (ref 3.4–10.8)

## 2019-12-05 PROCEDURE — 85025 COMPLETE CBC W/AUTO DIFF WBC: CPT | Performed by: OBSTETRICS & GYNECOLOGY

## 2019-12-05 PROCEDURE — 63710000001 DIPHENHYDRAMINE PER 50 MG: Performed by: OBSTETRICS & GYNECOLOGY

## 2019-12-05 RX ADMIN — DIPHENHYDRAMINE HYDROCHLORIDE 25 MG: 25 CAPSULE ORAL at 01:31

## 2019-12-05 RX ADMIN — DIPHENHYDRAMINE HYDROCHLORIDE 25 MG: 25 CAPSULE ORAL at 20:52

## 2019-12-05 RX ADMIN — HYDROCODONE BITARTRATE AND ACETAMINOPHEN 1 TABLET: 7.5; 325 TABLET ORAL at 01:30

## 2019-12-05 RX ADMIN — DOCUSATE SODIUM 100 MG: 100 CAPSULE, LIQUID FILLED ORAL at 16:43

## 2019-12-05 RX ADMIN — LABETALOL HCL 200 MG: 200 TABLET, FILM COATED ORAL at 20:31

## 2019-12-05 RX ADMIN — BENZOCAINE AND LEVOMENTHOL: 200; 5 SPRAY TOPICAL at 11:22

## 2019-12-05 RX ADMIN — CALCIUM CARBONATE 2 TABLET: 500 TABLET, CHEWABLE ORAL at 05:18

## 2019-12-05 RX ADMIN — IBUPROFEN 600 MG: 600 TABLET ORAL at 19:53

## 2019-12-05 RX ADMIN — LABETALOL HCL 200 MG: 200 TABLET, FILM COATED ORAL at 08:16

## 2019-12-05 RX ADMIN — IBUPROFEN 600 MG: 600 TABLET ORAL at 11:22

## 2019-12-05 RX ADMIN — PRENATAL VIT W/ FE FUMARATE-FA TAB 27-0.8 MG 1 TABLET: 27-0.8 TAB at 08:16

## 2019-12-05 NOTE — NURSING NOTE
Patient presented to LDR with c/o SROM. Cervical check performed at 1730 per RN. Exam was 1/50-60/-3. Patient ambulated to bathroom, came back and began complaining of severe pain. Updated physician on patient pain and cervical exam. Physician arrived and checked patient's cervix. Cervical exam was 10/100/+2. Patient began complaining of urge to push without being able to stop.

## 2019-12-05 NOTE — PLAN OF CARE
Problem: Patient Care Overview  Goal: Plan of Care Review  Outcome: Ongoing (interventions implemented as appropriate)   12/05/19 8396   Coping/Psychosocial   Plan of Care Reviewed With patient   Plan of Care Review   Progress improving   OTHER   Outcome Summary VSS WNL, fundus fml U1 scant lochia, ambulating room. Taking motrin for pain. Using ice packs     Goal: Individualization and Mutuality  Outcome: Ongoing (interventions implemented as appropriate)    Goal: Discharge Needs Assessment  Outcome: Ongoing (interventions implemented as appropriate)    Goal: Interprofessional Rounds/Family Conf  Outcome: Ongoing (interventions implemented as appropriate)      Problem: Breastfeeding (Adult,Obstetrics,Pediatric)  Goal: Signs and Symptoms of Listed Potential Problems Will be Absent, Minimized or Managed (Breastfeeding)  Outcome: Ongoing (interventions implemented as appropriate)      Problem: Postpartum (Vaginal Delivery) (Adult,Obstetrics,Pediatric)  Goal: Signs and Symptoms of Listed Potential Problems Will be Absent, Minimized or Managed (Postpartum)  Outcome: Ongoing (interventions implemented as appropriate)

## 2019-12-05 NOTE — PLAN OF CARE
Problem: Patient Care Overview  Goal: Plan of Care Review  Outcome: Ongoing (interventions implemented as appropriate)   12/05/19 0359   Coping/Psychosocial   Plan of Care Reviewed With patient;spouse   Plan of Care Review   Progress improving   OTHER   Outcome Summary VSS, FF/U1/ML, scant to light lochia, voiding and ambulating independently, pain being controlled with ice packs, tucks pads, and PRN motrin and percocet, breastfeeding well with minimal assistance, resting well between care     Goal: Individualization and Mutuality  Outcome: Ongoing (interventions implemented as appropriate)   12/05/19 0359   Individualization   Patient Specific Preferences Pain control   Patient Specific Goals (Include Timeframe) Control pain during shift   Patient Specific Interventions Give PRN pain meds as needed   Mutuality/Individual Preferences   What Anxieties, Fears, Concerns, or Questions Do You Have About Your Care? None at this time     Goal: Discharge Needs Assessment  Outcome: Ongoing (interventions implemented as appropriate)   12/05/19 0359   Discharge Needs Assessment   Readmission Within the Last 30 Days no previous admission in last 30 days   Concerns to be Addressed no discharge needs identified   Patient/Family Anticipates Transition to home   Patient/Family Anticipated Services at Transition none   Transportation Concerns car, none   Transportation Anticipated family or friend will provide   Anticipated Changes Related to Illness none     Goal: Interprofessional Rounds/Family Conf  Outcome: Ongoing (interventions implemented as appropriate)   12/05/19 0359   Interdisciplinary Rounds/Family Conf   Participants patient;family;nursing;physician       Problem: Breastfeeding (Adult,Obstetrics,Pediatric)  Goal: Signs and Symptoms of Listed Potential Problems Will be Absent, Minimized or Managed (Breastfeeding)  Outcome: Ongoing (interventions implemented as appropriate)   12/05/19 0359   Goal/Outcome Evaluation    Problems Assessed (Breastfeeding) all   Problems Present (Breastfeeding) none       Problem: Postpartum (Vaginal Delivery) (Adult,Obstetrics,Pediatric)  Goal: Signs and Symptoms of Listed Potential Problems Will be Absent, Minimized or Managed (Postpartum)  Outcome: Ongoing (interventions implemented as appropriate)   12/05/19 0359   Goal/Outcome Evaluation   Problems Assessed (Postpartum Vaginal Delivery) all   Problems Present (Postpartum Vag Deliv) pain   Pain controlled with PRN motrin and norco

## 2019-12-05 NOTE — L&D DELIVERY NOTE
[facility]  Vaginal Delivery Note    Delivery     Delivery: , Spontaneous     YOB: 2020    Time of Birth:  Gestational Age 7:36 PM   36w6d     Anesthesia: Local     Delivering clinician: Brittany Truong    Forceps?   No   Vacuum? No    Shoulder dystocia present: Yes Shoulder Dystocia Details  Dystocia Present? Yes   Time head delivered: 2019  7:35 PM   Gentle attempt at traction, assisted by maternal expulsive forces: Yes   If no, why:     Anterior shoulder:     Time recognized: 2019  7:35 PM     Time help called: 2019  7:35 PM  Called by: Ashley Acuna    Time provider arrived:    Provider who arrived:      Time NICU arrived: 2019  7:36 PM  NICU staff: ANG Holman and Lacey oJ RN    Time additional staff arrived: 2019  7:36 PM  Additional staff:             Delivery narrative:  Progressed to C/C/+2 and pushed well to deliver a LVI. EMERSON with shoulder dystocia noted and relieved with suprapubic, Mamadou and delivery of the posterior shoulder. LOT vigorous to mom's abdomen. Placenta spontaneous and intact with 3VC after IV Pitocin. 1st degree laceration repaired with 2-0 Chromic.  mL. No complications. Sponge and needle count correct.      Infant    Findings: female  infant     Infant observations: Weight: 3710 g (8 lb 2.9 oz)   Length: 21  in  Observations/Comments:         Apgars: 7   @ 1 minute /    8   @ 5 minutes   Infant Name: Shannan     Placenta, Cord, and Fluid    Placenta delivered  Spontaneous  at   2019  7:43 PM     Cord: 3 vessels  present.   Nuchal Cord?  no   Cord blood obtained: No    Cord gases obtained:  Yes    Cord gas results: Venous:  No results found for: PHCVEN    Arterial:  No results found for: PHCART     Repair    Episiotomy: None    no   Lacerations: Yes  Laceration Information  Laceration Repaired?   Perineal: 1st  Yes    Periurethral:         Labial:         Sulcus:         Vaginal: No       Cervical: No           Suture used for repair: 2-0 chromic gut   Estimated Blood Loss: Est. Blood Loss (mL): 200 mL(Filed from Delivery Summary) (12/04/19 1936)           Complications  precipitous delivery    Disposition  Mother to labor and delivery in stable condition currently.  Baby to remains with mom  in stable condition currently.      Brittany Truong DO  12/04/19  8:10 PM

## 2019-12-05 NOTE — LACTATION NOTE
Mother's Name:Kate Veras Phone #: [phone]   Infant Name: Shannan Solomon  : 19  Gestation: 36w6d  Day of life:1  Birth weight:  8-2.9 (3710g)  Discharge weight:  Weight Loss: -0.89%  24 hour Summary of Feeds: 4 Voids: 3 Stools:  3  Assistive devices (shields, shells, etc):  Significant Maternal history: , preeclampsia, gestational diabetes,  1st child with good supply  Maternal Concerns:  Just curious how much she can pump  Maternal Goal: breastfeed  Mother's Medications: FE, Diabeta, Normodyne, PNV  Breastpump for home: yes, 3, hand pump provided  Ped follow up appt:    Patient states breastfeeding is going well and she is without complaints of nipple tenderness. She successfully  her 1st child. She requested a pump to see what she could pump in addition to direct breastfeeding. Pump provided and assembled. Reviewed educational video handout and book with patient. Reviewed feeding plan, voids/stools, weight loss/gain, signs of transition of milk, preventing engorgement and mastitis, and lactation services.     Instructed mom our lactation team is here for continued support throughout their breastfeeding journey. Our team has encouraged mom to call with any questions or concerns that may arise after discharge.

## 2019-12-05 NOTE — PROGRESS NOTES
"      Danny Young MD  [facility]  [address]  [place] [address]  Office [phone]  Fax [phone]    [facility]  Vaginal Delivery Progress Note    Subjective   Postpartum Day 1: Vaginal Delivery    The patient feels well.  Her pain is well controlled with nonsteroidal anti-inflammatory drugs.   She is ambulating well.  Patient describes her bleeding as thin lochia. Awaiting peds evaluation of infants right clavicle and arm, s/p shoulder dystocia.    Breastfeeding: has not attempted yet.    Objective     Vital Signs Range for the last 24 hours  Temperature: Temp:  [97.6 °F (36.4 °C)-98.6 °F (37 °C)] 98.2 °F (36.8 °C)   Temp Source: Temp src: Temporal   BP: BP: (126-165)/(74-99) 126/74   Pulse: Heart Rate:  [] 88   Respirations: Resp:  [16-20] 18   SPO2: SpO2:  [94 %-98 %] 97 %   O2 Amount (l/min):     O2 Devices Device (Oxygen Therapy): room air   Weight: Weight:  [78.6 kg (173 lb 3.2 oz)-81.8 kg (180 lb 5.4 oz)] 78.6 kg (173 lb 3.2 oz)     Admit Height:  Height: 152.4 cm (60\")      Physical Exam:  General:  no acute distresss.  Abdomen: Fundus: appropriate, firm, non tender  Extremities: normal, atraumatic, no cyanosis, and trace edema.       Lab results reviewed:  No   Rubella:  No results found for: RUBELLAIGGIN Nurse Transcribed from prenatal record --  No components found for: EXTRUBELQUAL  Rh Status:    RH type   Date Value Ref Range Status   12/04/2019 Positive  Final     Immunizations:   There is no immunization history on file for this patient.  Lab Results (last 24 hours)     Procedure Component Value Units Date/Time    CBC & Differential [[id number]] Collected:  12/05/19 0659    Specimen:  Blood Updated:  12/05/19 0713    Narrative:       The following orders were created for panel order CBC & Differential.  Procedure                               Abnormality         Status                     ---------                               -----------         ------             "         CBC Auto Differential[[id number]]        Abnormal            Final result                 Please view results for these tests on the individual orders.    CBC Auto Differential [[id number]]  (Abnormal) Collected:  19 0659    Specimen:  Blood Updated:  19 0713     WBC 9.36 10*3/mm3      RBC 3.02 10*6/mm3      Hemoglobin 9.3 g/dL      Hematocrit 27.7 %      MCV 91.7 fL      MCH 30.8 pg      MCHC 33.6 g/dL      RDW 13.4 %      RDW-SD 44.6 fl      MPV 11.8 fL      Platelets 151 10*3/mm3      Neutrophil % 78.9 %      Lymphocyte % 14.4 %      Monocyte % 5.8 %      Eosinophil % 0.3 %      Basophil % 0.2 %      Immature Grans % 0.4 %      Neutrophils, Absolute 7.38 10*3/mm3      Lymphocytes, Absolute 1.35 10*3/mm3      Monocytes, Absolute 0.54 10*3/mm3      Eosinophils, Absolute 0.03 10*3/mm3      Basophils, Absolute 0.02 10*3/mm3      Immature Grans, Absolute 0.04 10*3/mm3      nRBC 0.0 /100 WBC     PAMG-1, Rapid Assay For ROM - Amniotic Fluid, Amniotic Sac [[id number]]  (Abnormal) Collected:  19 1734    Specimen:  Amniotic Fluid from Amniotic Sac Updated:  19 1743     Rupture of Membranes Positive          Assessment/Plan       Pregnancy      Kate MATA Rito is Day 1  post-partum  , Spontaneous    .      Plan:  Continue current care. Discussed delivery and shoulder dystocia with patient and partner.      Danny Young MD  2019  7:23 AM

## 2019-12-05 NOTE — H&P
[facility]  Kate Veras  : 1990  MRN: [id number]  CSN: [id number]    History and Physical    Late entry     Subjective   Kate Veras is a 29 y.o. year old  with an Estimated Date of Delivery: 19 currently at 36w6d presenting with regular contractions and leaking fluid and requesting to push.     Prenatal care has been with Dr. Javier.  It has been complicated by GDM - A2. Last EFW was 6lb 13oz    Obstetric History       T0      L1     SAB1   TAB0   Ectopic1   Molar0   Multiple0   Live Births1       # Outcome Date GA Lbr Triston/2nd Weight Sex Delivery Anes PTL Lv   4 Current            3 SAB 2018           2 Ectopic 2017           1  06/15/16 35w0d  2665 g (5 lb 14 oz) F CS-LTranv EPI  DELROY          Past Medical History:   Diagnosis Date   • Gestational diabetes    • Heart murmur     was told wouldn't cause problems   • Pre-eclampsia        Past Surgical History:   Procedure Laterality Date   •  SECTION      x1   • LASIK     • TONSILLECTOMY           Current Facility-Administered Medications:   •  lactated ringers bolus 1,000 mL, 1,000 mL, Intravenous, Once, Brittany Truong, DO  •  lactated ringers infusion, 125 mL/hr, Intravenous, Continuous, Brittany Truong, DO  •  lidocaine PF 1% (XYLOCAINE) injection 5 mL, 5 mL, Intradermal, PRN, Alexi, Brittany, DO  •  oxytocin (PITOCIN) 30 units in 0.9% sodium chloride 500 mL (premix), 999 mL/hr, Intravenous, Once **FOLLOWED BY** oxytocin (PITOCIN) 30 units in 0.9% sodium chloride 500 mL (premix), 250 mL/hr, Intravenous, Continuous, Last Rate: 250 mL/hr at 19, 250 mL/hr at 19 **FOLLOWED BY** oxytocin (PITOCIN) 30 units in 0.9% sodium chloride 500 mL (premix), 125 mL/hr, Intravenous, Continuous PRN, Pee Truongine, DO  •  sodium chloride 0.9 % flush 3 mL, 3 mL, Intravenous, Q12H, Alexi, Brittany, DO  •  sodium chloride 0.9 % flush 3-10 mL, 3-10 mL, Intravenous, PRN,  "Truong BrittanyDO    No Known Allergies    History reviewed. No pertinent family history.    Social History     Tobacco Use   • Smoking status: Never Smoker   • Smokeless tobacco: Never Used   Substance Use Topics   • Alcohol use: No     Frequency: Never   • Drug use: No       Review of Systems   Genitourinary: Positive for pelvic pain, vaginal bleeding and vaginal discharge.           Objective   Ht 152.4 cm (60\")   Wt 81.2 kg (179 lb)   BMI 34.96 kg/m²   General: well developed; well nourished  no acute distress   Heart: Not performed.   Lungs: breathing is unlabored   Abdomen: soft, non-tender; no masses  no umbilical or inguinal hernias are present  no hepato-splenomegaly   FHT's: Reactive reassuring  external monitors used   Cervix: was checked (by me): 10 cm / 100 % / +2   Presentation: cephalic         Prenatal Labs  Lab Results   Component Value Date    HGB 10.7 (L) 2019    ABORH A Rh Positive 06/15/2016    ABO A 2019    RH Positive 2019    ABSCRN Negative 2019       Current Labs Reviewed   CBC       Assessment   1. IUP at 36w6d  2. Group B strep status: unknown  3. GDMA2  4.   5. In active labor and pushing      Plan   1. Delivered    Brittany TruongDO  2019  8:06 PM     "

## 2019-12-06 VITALS
RESPIRATION RATE: 20 BRPM | BODY MASS INDEX: 33.83 KG/M2 | HEIGHT: 60 IN | SYSTOLIC BLOOD PRESSURE: 132 MMHG | WEIGHT: 172.3 LBS | TEMPERATURE: 99.4 F | DIASTOLIC BLOOD PRESSURE: 74 MMHG | OXYGEN SATURATION: 98 % | HEART RATE: 79 BPM

## 2019-12-06 LAB
ALBUMIN SERPL-MCNC: 3.3 G/DL (ref 3.5–5.2)
ALBUMIN/GLOB SERPL: 1.2 G/DL
ALP SERPL-CCNC: 117 U/L (ref 39–117)
ALT SERPL W P-5'-P-CCNC: 30 U/L (ref 1–33)
ANION GAP SERPL CALCULATED.3IONS-SCNC: 11 MMOL/L (ref 5–15)
AST SERPL-CCNC: 61 U/L (ref 1–32)
BILIRUB SERPL-MCNC: <0.2 MG/DL (ref 0.2–1.2)
BUN BLD-MCNC: 11 MG/DL (ref 6–20)
BUN/CREAT SERPL: 26.8 (ref 7–25)
CALCIUM SPEC-SCNC: 9.2 MG/DL (ref 8.6–10.5)
CHLORIDE SERPL-SCNC: 102 MMOL/L (ref 98–107)
CO2 SERPL-SCNC: 26 MMOL/L (ref 22–29)
CREAT BLD-MCNC: 0.41 MG/DL (ref 0.57–1)
GFR SERPL CREATININE-BSD FRML MDRD: >150 ML/MIN/1.73
GLOBULIN UR ELPH-MCNC: 2.7 GM/DL
GLUCOSE BLD-MCNC: 131 MG/DL (ref 65–99)
POTASSIUM BLD-SCNC: 4.2 MMOL/L (ref 3.5–5.2)
PROT SERPL-MCNC: 6 G/DL (ref 6–8.5)
SODIUM BLD-SCNC: 139 MMOL/L (ref 136–145)
URATE SERPL-MCNC: 5.2 MG/DL (ref 2.4–5.7)

## 2019-12-06 PROCEDURE — 80053 COMPREHEN METABOLIC PANEL: CPT | Performed by: OBSTETRICS & GYNECOLOGY

## 2019-12-06 PROCEDURE — 84550 ASSAY OF BLOOD/URIC ACID: CPT | Performed by: OBSTETRICS & GYNECOLOGY

## 2019-12-06 PROCEDURE — 90471 IMMUNIZATION ADMIN: CPT | Performed by: OBSTETRICS & GYNECOLOGY

## 2019-12-06 PROCEDURE — 90707 MMR VACCINE SC: CPT | Performed by: OBSTETRICS & GYNECOLOGY

## 2019-12-06 PROCEDURE — 25010000002 MEASLES, MUMPS & RUBELLA VAC RECONSTITUTED SOLUTION: Performed by: OBSTETRICS & GYNECOLOGY

## 2019-12-06 RX ADMIN — PRENATAL VIT W/ FE FUMARATE-FA TAB 27-0.8 MG 1 TABLET: 27-0.8 TAB at 09:08

## 2019-12-06 RX ADMIN — MEASLES, MUMPS, AND RUBELLA VIRUS VACCINE LIVE 0.5 ML: 1000; 12500; 1000 INJECTION, POWDER, LYOPHILIZED, FOR SUSPENSION SUBCUTANEOUS at 10:20

## 2019-12-06 RX ADMIN — LABETALOL HCL 200 MG: 200 TABLET, FILM COATED ORAL at 09:08

## 2019-12-06 RX ADMIN — IBUPROFEN 600 MG: 600 TABLET ORAL at 12:03

## 2019-12-06 NOTE — DISCHARGE SUMMARY
[facility]    Avtar Mendenhall MD  [address]  [place] [address]  [phone]      Discharge Summary     Werner Veras  : 1990  MRN: [id number]  CSN: [id number]    Date of Admission: 2019   Date of Discharge:  2019   Delivering Physician: Brittany Truong        Admission Diagnosis: 1. Pregnancy [Z34.90]   Discharge Diagnosis: 1. Pregnancy at 36w6d - delivered       Procedures: 2019  - , Spontaneous       Hospital Course  Patient is a 29 y.o.  who at 36w6d had a vaginal birth.  Her postpartum course was without complications.  On PPD #2 she was ready for discharge.  She had normal lochia and pain well controlled with oral medications.    Infant  female  fetus weighing 3710 g (8 lb 2.9 oz)   Apgars -  7  @ 1 minute /  8  @ 5 minutes.    Discharge labs  Lab Results   Component Value Date    WBC 9.36 2019    HGB 9.3 (L) 2019    HCT 27.7 (L) 2019     2019       Discharge Medications     Discharge Medications      Continue These Medications      Instructions Start Date   ferrous sulfate 325 (65 FE) MG tablet   325 mg, 3 Times Daily      labetalol 100 MG tablet  Commonly known as:  NORMODYNE   200 mg, Oral, 2 Times Daily      PRENATAL 1 PLUS 1 PO   Oral, Daily         Stop These Medications    glyburide 5 MG tablet  Commonly known as:  DIABETA            Discharge Disposition Home or Self Care   Condition on Discharge: good   Follow-up: 2 weeks with Yaya Mendenhall MD  2019

## 2019-12-06 NOTE — LACTATION NOTE
Mother's Name:Kate Veras Phone #: [phone]   Infant Name: Shannan Solomon  : 19  Gestation: 36w6d  Day of life:2  Birth weight:  8-2.9 (3710g)  Discharge weight:7-11.1 (3489g)  Weight Loss: -5.96%  24 hour Summary of Feeds: 8 + gtts EBM Voids: 5 Stools:  1  Assistive devices (shields, shells, etc):  Significant Maternal history: , preeclampsia, gestational diabetes,  1st child with good supply  Maternal Concerns:  Just curious how much she can pump  Maternal Goal: breastfeed  Mother's Medications: FE, Diabeta, Normodyne, PNV  Breastpump for home: yes, 3, hand pump provided  Ped follow up appt: [facility] 1000 on 19 with bili, and in 3 days, and ped in 2 weeks    Mother states she has had difficulty getting infant to begin nursing at times. She is latching but sucks a few times and comes off or latches but will not suck and continues to root. Reviewed feeding plan, expected ifnant voids/stools, expected infant weight loss/gain, Jaundice, Lactation appt plan of care, and preventing engorgement/mastitis.     1000  Positioning and latching techniques reviewed while observing mother return demonstrate recommendations. Infant latched and began nursing with deep jaw drops noted and audible swallows. Stimulation to suck required occasionally. Infant's right arm is flaccid and shoulder is bruised from shoulder dystocia, and mother concerned infant is having feeding difficulty due to should pain. Discussed positioning options to secure arm and limit mobility during feeds. Also reviewed waking techniques. With minor adjustments to mother's shaping and rolling technique, infant latched deeply and nursed well on each breast.     Instructed mom our lactation team is here for continued support throughout their breastfeeding journey. Our team has encouraged mom to call with any questions or concerns that may arise after discharge.

## 2019-12-06 NOTE — PROGRESS NOTES
"[facility]  Vaginal Delivery Progress Note    Subjective   Postpartum Day 2: Vaginal Delivery    The patient feels well.  Her pain is well controlled with nonsteroidal anti-inflammatory drugs.   She is ambulating well.  Patient describes her bleeding as thin lochia.    Breastfeeding: infant latching without difficulty.    Objective     Vital Signs Range for the last 24 hours  Temperature: Temp:  [97.6 °F (36.4 °C)-98.4 °F (36.9 °C)] 97.6 °F (36.4 °C)   Temp Source: Temp src: Oral   BP: BP: (118-133)/(66-84) 125/72   Pulse: Heart Rate:  [83-90] 89   Respirations: Resp:  [18-20] 18   SPO2: SpO2:  [97 %-99 %] 98 %   O2 Amount (l/min):     O2 Devices Device (Oxygen Therapy): room air   Weight: Weight:  [78.2 kg (172 lb 4.8 oz)] 78.2 kg (172 lb 4.8 oz)     Admit Height:  Height: 152.4 cm (60\")      Physical Exam:  General:  no acute distresss.  Abdomen: abdomen is soft without significant tenderness, masses, organomegaly or guarding. Fundus: appropriate, firm, non tender  Extremities: normal, atraumatic, no cyanosis, and trace edema.     Lab results reviewed:  Yes   Rubella:  No results found for: RUBELLAIGGIN Nurse Transcribed from prenatal record --  No components found for: EXTRUBELQUAL  Rh Status:    RH type   Date Value Ref Range Status   2019 Positive  Final     Immunizations:   There is no immunization history on file for this patient.  Lab Results (last 24 hours)     Procedure Component Value Units Date/Time    Tissue Pathology Exam [[id number]] Collected:  19 1948    Specimen:  Tissue from Placenta Updated:  19 1028          Assessment/Plan       Pregnancy      Kate Veras is Day 2  post-partum  , Spontaneous    .      Plan:  Discharge home with standard precautions and return to clinic in 4-6 weeks.      Brittany Truong DO  2019  8:21 AM      "

## 2019-12-06 NOTE — DISCHARGE INSTR - APPOINTMENTS
You have an appointment with Dr. Javier on Thursday Dec 12, 2019 at 10:00 am.    Appointment with  on December 18th at 9:15 am

## 2019-12-06 NOTE — PLAN OF CARE
Problem: Patient Care Overview  Goal: Plan of Care Review  Outcome: Ongoing (interventions implemented as appropriate)   12/06/19 0218   Coping/Psychosocial   Plan of Care Reviewed With patient;spouse   Plan of Care Review   Progress improving   OTHER   Outcome Summary VSS, FF/U1/ML, scant lochia, voiding and ambulating independently, pain controlled with motrin and comfort products for perineal laceration this shift, breastfeeding and pumping, resting well between care, bonding appropriately with infant      Goal: Individualization and Mutuality  Outcome: Ongoing (interventions implemented as appropriate)   12/06/19 0218   Individualization   Patient Specific Preferences Pain control   Patient Specific Goals (Include Timeframe) Control pain during shift   Patient Specific Interventions Give PRN pain meds as needed   Mutuality/Individual Preferences   What Anxieties, Fears, Concerns, or Questions Do You Have About Your Care? None at this time     Goal: Discharge Needs Assessment  Outcome: Ongoing (interventions implemented as appropriate)   12/06/19 0218   Discharge Needs Assessment   Readmission Within the Last 30 Days no previous admission in last 30 days   Concerns to be Addressed no discharge needs identified   Patient/Family Anticipates Transition to home   Patient/Family Anticipated Services at Transition none   Transportation Concerns car, none   Transportation Anticipated family or friend will provide   Disability   Equipment Currently Used at Home none     Goal: Interprofessional Rounds/Family Conf  Outcome: Ongoing (interventions implemented as appropriate)   12/06/19 0218   Interdisciplinary Rounds/Family Conf   Participants patient;family;nursing;physician       Problem: Breastfeeding (Adult,Obstetrics,Pediatric)  Goal: Signs and Symptoms of Listed Potential Problems Will be Absent, Minimized or Managed (Breastfeeding)  Outcome: Ongoing (interventions implemented as appropriate)   12/06/19 0218    Goal/Outcome Evaluation   Problems Assessed (Breastfeeding) all   Problems Present (Breastfeeding) none       Problem: Postpartum (Vaginal Delivery) (Adult,Obstetrics,Pediatric)  Goal: Signs and Symptoms of Listed Potential Problems Will be Absent, Minimized or Managed (Postpartum)  Outcome: Ongoing (interventions implemented as appropriate)   12/06/19 0218   Goal/Outcome Evaluation   Problems Assessed (Postpartum Vaginal Delivery) all   Problems Present (Postpartum Vag Deliv) none

## 2019-12-06 NOTE — NURSING NOTE
Discharge teaching and AVS given, patient is aware of when to notify MD with concerns and patient is aware of follow-up appointments. Teaching done per Joi Ríos RN

## 2019-12-07 ENCOUNTER — HOSPITAL ENCOUNTER (OUTPATIENT)
Dept: LACTATION | Facility: HOSPITAL | Age: 29
Discharge: HOME | End: 2019-12-07

## 2019-12-07 NOTE — LACTATION NOTE
Mother's Name: Kate  Contact Number: [phone]  G/P:4/2  Breastfeeding Hx:  first child for 1 year, required supplementing initially in first 3 days and again in last month of breastfeeding.  Significant Medical History:Pre-eclampsia, Gestational DM  Maternal Breast Assessment:Breast are soft and without trauma    Infant's Name: Shannan Solomon  Date of Birth:[birth year]  Gestational age at Birth: 36w6d  Age:3 days  Physician:Dr. Hairston                     Reason for Visit:Initial lactation follow up          Infant's Birth weight:8-2.9 (3710g)  Previous Weight: 7-11.1 (3489g)  Wt Loss:-5.96%      Today's Weight:   7-5.7 (3339g)  Wt Loss:-10%    Feeding History Since Discharge/Last Lactation Appt.: Mother states she wakes infant every 2-3 hours for feedings, infant nurses 10-20 mins on each breast. Mother has also been pumping after feedings collecting drops    Past 24 Hours Voids/Stools: 4 voids no stools       Color of Stool:last stool on 12/5/19 meconium    Pre Weight:7-6.4(3357g)           Left Breast: 15 mins total          7-6.4 (3356g)           Right Breast:   Not attempted                Total Minutes:  15 mins           Total Weight Gain: -1         gms    Average Feeding Amount for Age: 15-30 ml (0.5-1 oz)    Interventions:After pre- weight obtained, mother positions infant in cradle hold. Infant initially rooting and fussing,latches to left breast very shallow- nipple only, although mother denies pain with latch, I recommended attempting to latch infant more appropriately. After multiple attempts, infant did latch with deep latch, began sucking with quick, deep jaw drops without any notable swallows to follow. Infant quickly fell asleep, required constant stimulation for sucks. After 5 mins, infant removed from breast, weight checked with 1gm weight loss. Recommended mother to attempt waking infant with lionel stimulus. Infant reluctant to wake. Once infant semi awake, moved infant to football hold  and further attempts made, it appears that mother's nipple slips below infant's mouth after initial latch. After several attempts, introduce nipple shield 20mm, infant immediately latched with nipple shield in place, flanged lips exhibited and deep jaw drops noted, with very few swallows during remaining 10 mins of feeding attempt. Infant released from breast, sleeping. Weight rechecked with no gain during total 15 min feeding. Recommended to end feeding attempt at the breast and supplement infant with formula immediately. Parents understanding and agreeable. Provided FOB with 30 ml of Similac Pro-advance ready to feed formula with slow flow nipple, discussed paced bottle feeding. FOB provided infant with formula supplementation while lactation assisted Kate with pumping. Pumped for 15 mins, provided breast compression, collected 2 ml of EBM. Shannan took 25 ml of formula from the bottle and resting afterwards. Before end of lactation consultation Shannan had a void as well as a meconium stool!    Education:Milk supply/demand  Paced bottle feedings  Average feeding amounts  Power pumping for increase in milk supply  Pumping after all feedings attempts    Notified MD/ Orders Received: YES.  Continue supplementing with formula/EBM. Return to [facility] 12/8/19 for recheck of bilirubin. Follow up with [facility] on Monday 12/9/19.    Feeding Plan: Limit all feeding attempts to 15 mins, Kate is to pump for 15 mins after all feeding attempts while FOB provides Shannan with EBM and/or formula, allowing Shannan to take as much of feeding as she tolerates with at least minimum of expected amounts/day of life. If aKte feels up to it, she may also begin power pumping twice per day  (once in the morning and once before bedtime) to aid in increasing milk supply.     Plan of Care:    Interventions require further assessment with [facility]    Interventions require further assessment with MD    Jamison  Appointments:    Lactation: Monday, December 9th at 1100 am    Physician:Monday, December 9th at 1030 am    Signature:Clau Martini RN    Faxed to:Dr. Hairston/Judi  Date:12/7/19

## 2019-12-09 ENCOUNTER — APPOINTMENT (OUTPATIENT)
Dept: LACTATION | Facility: HOSPITAL | Age: 29
End: 2019-12-09

## 2019-12-11 ENCOUNTER — POSTPARTUM VISIT (OUTPATIENT)
Dept: OBSTETRICS AND GYNECOLOGY | Facility: CLINIC | Age: 29
End: 2019-12-11

## 2019-12-11 ENCOUNTER — HOSPITAL ENCOUNTER (OUTPATIENT)
Facility: HOSPITAL | Age: 29
Setting detail: OBSERVATION
Discharge: HOME | End: 2019-12-11
Attending: OBSTETRICS & GYNECOLOGY | Admitting: OBSTETRICS & GYNECOLOGY

## 2019-12-11 ENCOUNTER — HOSPITAL ENCOUNTER (OUTPATIENT)
Facility: HOSPITAL | Age: 29
End: 2019-12-11
Attending: OBSTETRICS & GYNECOLOGY | Admitting: OBSTETRICS & GYNECOLOGY

## 2019-12-11 VITALS
WEIGHT: 160 LBS | BODY MASS INDEX: 31.41 KG/M2 | DIASTOLIC BLOOD PRESSURE: 100 MMHG | SYSTOLIC BLOOD PRESSURE: 180 MMHG | HEIGHT: 60 IN

## 2019-12-11 VITALS — SYSTOLIC BLOOD PRESSURE: 135 MMHG | HEART RATE: 79 BPM | DIASTOLIC BLOOD PRESSURE: 80 MMHG

## 2019-12-11 LAB
ALBUMIN SERPL-MCNC: 4.1 G/DL (ref 3.5–5.2)
ALBUMIN/GLOB SERPL: 1.3 G/DL
ALP SERPL-CCNC: 126 U/L (ref 39–117)
ALT SERPL W P-5'-P-CCNC: 32 U/L (ref 1–33)
ANION GAP SERPL CALCULATED.3IONS-SCNC: 10 MMOL/L (ref 5–15)
AST SERPL-CCNC: 33 U/L (ref 1–32)
BASOPHILS # BLD AUTO: 0.04 10*3/MM3 (ref 0–0.2)
BASOPHILS NFR BLD AUTO: 0.6 % (ref 0–1.5)
BILIRUB SERPL-MCNC: 0.2 MG/DL (ref 0.2–1.2)
BUN BLD-MCNC: 12 MG/DL (ref 6–20)
BUN/CREAT SERPL: 29.3 (ref 7–25)
CALCIUM SPEC-SCNC: 9.3 MG/DL (ref 8.6–10.5)
CHLORIDE SERPL-SCNC: 102 MMOL/L (ref 98–107)
CO2 SERPL-SCNC: 27 MMOL/L (ref 22–29)
CREAT BLD-MCNC: 0.41 MG/DL (ref 0.57–1)
DEPRECATED RDW RBC AUTO: 45 FL (ref 37–54)
EOSINOPHIL # BLD AUTO: 0.22 10*3/MM3 (ref 0–0.4)
EOSINOPHIL NFR BLD AUTO: 3.3 % (ref 0.3–6.2)
ERYTHROCYTE [DISTWIDTH] IN BLOOD BY AUTOMATED COUNT: 13.3 % (ref 12.3–15.4)
GFR SERPL CREATININE-BSD FRML MDRD: >150 ML/MIN/1.73
GLOBULIN UR ELPH-MCNC: 3.2 GM/DL
GLUCOSE BLD-MCNC: 100 MG/DL (ref 65–99)
HCT VFR BLD AUTO: 29.8 % (ref 34–46.6)
HGB BLD-MCNC: 10 G/DL (ref 12–15.9)
IMM GRANULOCYTES # BLD AUTO: 0.11 10*3/MM3 (ref 0–0.05)
IMM GRANULOCYTES NFR BLD AUTO: 1.7 % (ref 0–0.5)
LYMPHOCYTES # BLD AUTO: 1.49 10*3/MM3 (ref 0.7–3.1)
LYMPHOCYTES NFR BLD AUTO: 22.4 % (ref 19.6–45.3)
MCH RBC QN AUTO: 30.8 PG (ref 26.6–33)
MCHC RBC AUTO-ENTMCNC: 33.6 G/DL (ref 31.5–35.7)
MCV RBC AUTO: 91.7 FL (ref 79–97)
MONOCYTES # BLD AUTO: 0.3 10*3/MM3 (ref 0.1–0.9)
MONOCYTES NFR BLD AUTO: 4.5 % (ref 5–12)
NEUTROPHILS # BLD AUTO: 4.48 10*3/MM3 (ref 1.7–7)
NEUTROPHILS NFR BLD AUTO: 67.5 % (ref 42.7–76)
NRBC BLD AUTO-RTO: 0 /100 WBC (ref 0–0.2)
PLATELET # BLD AUTO: 297 10*3/MM3 (ref 140–450)
PMV BLD AUTO: 9.4 FL (ref 6–12)
POTASSIUM BLD-SCNC: 4.6 MMOL/L (ref 3.5–5.2)
PROT SERPL-MCNC: 7.3 G/DL (ref 6–8.5)
RBC # BLD AUTO: 3.25 10*6/MM3 (ref 3.77–5.28)
SODIUM BLD-SCNC: 139 MMOL/L (ref 136–145)
URATE SERPL-MCNC: 5.4 MG/DL (ref 2.4–5.7)
WBC NRBC COR # BLD: 6.64 10*3/MM3 (ref 3.4–10.8)

## 2019-12-11 PROCEDURE — 84550 ASSAY OF BLOOD/URIC ACID: CPT | Performed by: OBSTETRICS & GYNECOLOGY

## 2019-12-11 PROCEDURE — 85025 COMPLETE CBC W/AUTO DIFF WBC: CPT | Performed by: OBSTETRICS & GYNECOLOGY

## 2019-12-11 PROCEDURE — G0378 HOSPITAL OBSERVATION PER HR: HCPCS

## 2019-12-11 PROCEDURE — 0503F POSTPARTUM CARE VISIT: CPT | Performed by: OBSTETRICS & GYNECOLOGY

## 2019-12-11 PROCEDURE — 80053 COMPREHEN METABOLIC PANEL: CPT | Performed by: OBSTETRICS & GYNECOLOGY

## 2019-12-11 PROCEDURE — G0463 HOSPITAL OUTPT CLINIC VISIT: HCPCS

## 2019-12-11 PROCEDURE — 36415 COLL VENOUS BLD VENIPUNCTURE: CPT | Performed by: OBSTETRICS & GYNECOLOGY

## 2019-12-11 NOTE — LACTATION NOTE
Significant history: , Preeclampsia, Gestational DM,  (2016), unintentional  (19) with shoulder dystocia, infant hospitalized for hyperbilirubinemia on 19, supplementation needed in the 1st 3 days with 1st child but she was able to exclusively breastfeed from day 4 -  Feeding Plan: breastfeed 15 minutes and offer EBM/Formula every 3 hours  Mother's Medications:   Breastpump for home: yes  Ped follow up appt:    Patient admitted for HTN. Patient has had a very stressful few days with infant being readmitted after delivery and discharged again yesterday. She has had some breastfeeding difficulties and has been supplementing with EBM/formula after every breastfeed. Hospital Grade breast pump assembled in room and supplies at [facility]. She is presently using Blanka bottles for supplementing and has them with her. She states infant's bottle feeding has improved significantly with Blanka wide base nipples. She has seen no color changes in infant since discharge yesterday. Infant had an appt with Pediatrician this am and has gained additional weight and bilirubin 10.6 today. Patient states infant had a large BM last night as well. Bassinet obtained for infant use. Continue breastfeeding 15 minutes on one breast and compliment with EBM or Formula after each feeding. Pumping after each feeding.     Instructed mom our lactation team is here for continued support throughout their breastfeeding journey. Our team has encouraged mom to call with any questions or concerns that may arise after discharge.

## 2019-12-11 NOTE — PROGRESS NOTES
"Subjective   Chief Complaint   Patient presents with   • Postpartum Care     pt here today for 1 week PP visit. pt had baby girl Shannan. pt is currently bottle and breastfeeding. pt voices no concerns.      Kate Veras is a 29 y.o. year old  presenting to be seen for her postpartum visit.  She had a vaginal delivery with a shoulder dystocia one week ago.  Patient reports this week has been really stressful because baby had to be readmitted for high bilirubin.   Prenatal course was been complicated by CHTN.  Pt was induced for preeclampsia.  Patient had a really bad HA yesterday, but denies HA now; it got better with tylenol.  Patient has no RUQ pain and has no N/V.    Since delivery she has not been sexually active.  She does not have concerns about post-partum blues/depression; she is just stressed and exhausted.   She is both breast and bottle feeding.    The following portions of the patient's history were reviewed and updated as appropriate:current medications and allergies    Social History    Tobacco Use      Smoking status: Never Smoker      Smokeless tobacco: Never Used    Review of Systems      Objective   /100   Ht 152.4 cm (60\")   Wt 72.6 kg (160 lb)   BMI 31.25 kg/m²     General:  well developed; well nourished  no acute distress   Abdomen: soft, non-tender; no masses   Pelvis: Not performed.          Assessment   1. 1 week postpartum exam  2. Severe HA yesterday.  BP markedly elevated      Plan   1. Patient to L&D for CBC and CPM with uric acid  2. Will consider postpartum magnesium sulfate if indicated    No orders of the defined types were placed in this encounter.         This note was electronically signed.    Fanta Javier MD  2019    "

## 2019-12-11 NOTE — NURSING NOTE
Labs, b/p, urine reported to dr. Javier. Pt to be d/c to home and increase her labetalol to 400 mg in the am and 400mg in the evening. Pt to follow up with dr. javier next week. Appointment already made.

## 2019-12-17 LAB
LAB AP CASE REPORT: NORMAL
LAB AP CLINICAL INFORMATION: NORMAL
PATH REPORT.FINAL DX SPEC: NORMAL
PATH REPORT.GROSS SPEC: NORMAL

## 2019-12-18 ENCOUNTER — POSTPARTUM VISIT (OUTPATIENT)
Dept: OBSTETRICS AND GYNECOLOGY | Facility: CLINIC | Age: 29
End: 2019-12-18

## 2019-12-18 VITALS
HEIGHT: 60 IN | BODY MASS INDEX: 30.04 KG/M2 | SYSTOLIC BLOOD PRESSURE: 124 MMHG | WEIGHT: 153 LBS | DIASTOLIC BLOOD PRESSURE: 94 MMHG

## 2019-12-18 PROCEDURE — 0503F POSTPARTUM CARE VISIT: CPT | Performed by: OBSTETRICS & GYNECOLOGY

## 2019-12-18 NOTE — PROGRESS NOTES
"Subjective   Chief Complaint   Patient presents with   • Postpartum Care      18. female. breastfeeding.      Kate Veras is a 29 y.o. year old  presenting to be seen for her postpartum visit.  She had an unintentional  2 weeks ago.   Prenatal course was been benign.  Patient still bleeding, about like a light period and starting to look more brown.  She is having no pain.  Baby doing well, although still not using one arm (delivery was a dystocia)    Since delivery she has not been sexually active.  She does not have concerns about post-partum blues/depression.   She is breastfeeding and reports it is going well.    The following portions of the patient's history were reviewed and updated as appropriate:current medications and allergies    Social History    Tobacco Use      Smoking status: Never Smoker      Smokeless tobacco: Never Used    Review of Systems      Objective   /94 (BP Location: Left arm, Patient Position: Sitting)   Ht 152.4 cm (60\")   Wt 69.4 kg (153 lb)   Breastfeeding Yes   BMI 29.88 kg/m²     General:  well developed; well nourished  no acute distress   Abdomen: soft, non-tender; no masses   Pelvis: Not performed.          Assessment   1. Normal 2 week postpartum exam  2. Doing well, still taking labetalol 400 mg bid     Plan   1. BC options not even discussed - patient wants to wait until next visit  2. RTO in 4 weeks    No orders of the defined types were placed in this encounter.         This note was electronically signed.    Fanta Javier MD  2019    "

## 2020-01-16 ENCOUNTER — POSTPARTUM VISIT (OUTPATIENT)
Dept: OBSTETRICS AND GYNECOLOGY | Facility: CLINIC | Age: 30
End: 2020-01-16

## 2020-01-16 VITALS
SYSTOLIC BLOOD PRESSURE: 138 MMHG | DIASTOLIC BLOOD PRESSURE: 68 MMHG | HEIGHT: 60 IN | WEIGHT: 155 LBS | BODY MASS INDEX: 30.43 KG/M2

## 2020-01-16 DIAGNOSIS — N39.3 SUI (STRESS URINARY INCONTINENCE, FEMALE): Primary | ICD-10-CM

## 2020-01-16 DIAGNOSIS — I10 ESSENTIAL HYPERTENSION: ICD-10-CM

## 2020-01-16 DIAGNOSIS — Z30.011 ENCOUNTER FOR INITIAL PRESCRIPTION OF CONTRACEPTIVE PILLS: ICD-10-CM

## 2020-01-16 PROCEDURE — 0503F POSTPARTUM CARE VISIT: CPT | Performed by: OBSTETRICS & GYNECOLOGY

## 2020-01-16 RX ORDER — ACETAMINOPHEN AND CODEINE PHOSPHATE 120; 12 MG/5ML; MG/5ML
1 SOLUTION ORAL DAILY
Qty: 28 TABLET | Refills: 12 | Status: SHIPPED | OUTPATIENT
Start: 2020-01-16 | End: 2021-01-15

## 2020-01-16 RX ORDER — LABETALOL 200 MG/1
400 TABLET, FILM COATED ORAL 2 TIMES DAILY
Qty: 120 TABLET | Refills: 2 | Status: SHIPPED | OUTPATIENT
Start: 2020-01-16 | End: 2021-07-22

## 2020-01-16 NOTE — PROGRESS NOTES
"Subjective   Chief Complaint   Patient presents with   • Postpartum Care     pt here today for 6 week PP visit. pt had baby girl named Shannan. pt is currently breast pumping and bottle feeding. pt voices that she has lost control of her bladder a few times.  pt voices no concerns.      Kate Veras is a 29 y.o. year old  presenting to be seen for her postpartum visit.  She had a vaginal delivery complicated by shoulder dystocia and 1st degree laceration.   Prenatal course was been benign.  Patient has no pain.  Lochia stopped and she has had first period already.  Patient c/o bladder incontinence 3-4 times since delivery, although this week has been a little better than first month after delivery.    Since delivery she has not been sexually active.  She does not have concerns about post-partum blues/depression.   She is breastfeeding and reports she has a good supply..    The following portions of the patient's history were reviewed and updated as appropriate:current medications and allergies    Social History    Tobacco Use      Smoking status: Never Smoker      Smokeless tobacco: Never Used    Review of Systems      Objective   /68   Ht 152.4 cm (60\")   Wt 70.3 kg (155 lb)   Breastfeeding Yes   BMI 30.27 kg/m²     General:  well developed; well nourished  no acute distress   Abdomen: soft, non-tender; no masses   Pelvis: Not performed.          Assessment   1. Normal 6 week postpartum exam  2. HANNAH since delivery  3. Breastfeeding  4. No depression or blues  5. BP well-controlled on labetalol     Plan   1. BC options not compared today: patient already knew that she wanted OCP - will use micronor until no longer supplying all of babies calories  2. Physical therapy for pelvic floor strengthening    No orders of the defined types were placed in this encounter.         This note was electronically signed.    Fanta Javier MD  2020    "

## 2020-01-29 ENCOUNTER — TREATMENT (OUTPATIENT)
Dept: PHYSICAL THERAPY | Facility: CLINIC | Age: 30
End: 2020-01-29

## 2020-01-29 DIAGNOSIS — N39.3 SUI (STRESS URINARY INCONTINENCE, FEMALE): Primary | ICD-10-CM

## 2020-01-29 DIAGNOSIS — R39.15 URINARY URGENCY: ICD-10-CM

## 2020-01-29 PROCEDURE — 97110 THERAPEUTIC EXERCISES: CPT | Performed by: PHYSICAL THERAPIST

## 2020-01-29 PROCEDURE — 97161 PT EVAL LOW COMPLEX 20 MIN: CPT | Performed by: PHYSICAL THERAPIST

## 2020-01-29 NOTE — PROGRESS NOTES
Outpatient Physical Therapy Pelvic Health Initial Evaluation       Patient Name: Kate Veras  : 1990  MRN: [id number]  Today's Date: 2020        Visit Date: 2020    Patient Active Problem List   Diagnosis   • Ectopic pregnancy   • Pregnancy   • Dehydration        Past Medical History:   Diagnosis Date   • Gestational diabetes    • Heart murmur     was told wouldn't cause problems   • Pre-eclampsia         Past Surgical History:   Procedure Laterality Date   •  SECTION      x1   • LASIK     • TONSILLECTOMY           Visit Dx:    ICD-10-CM ICD-9-CM   1. HANNAH (stress urinary incontinence, female) N39.3 625.6   2. Urinary urgency R39.15 788.63       Patient History     Row Name 20 1300             History    Chief Complaint  Other 1 (comment) Incontinence  -KR      Date Current Problem(s) Began  19  -KR      Brief Description of Current Complaint  Patient reports she was scheduled for her 2nd  was scheduled for 14 hours later.  She ended up having  and has had issues with incontinence since then.  She is now wearing a pad 24 hours per day now.  She relates that she leaks when she laughs,sneezes, coughs, or hears water running.  She has been urinating 8-10 times per day and once per night. She estimates her leakage as about a tablespoon.  She has urgency as well and about 2 times per day and leaks about a 1/4 of a cup while getting her clothing pulled down. 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  She denies constipation.  She has not had sexual intercourse since onset of incontinence.  -KR      Previous treatment for THIS PROBLEM  Other (comment) None  -KR      Patient/Caregiver Goals  Improve strength;Know what to do to help the symptoms;Other (comment) alleviate stress and urgency incontinence  -KR      Current Tobacco Use  No  -KR      Smoking Status  Quit 8 years ago  -KR      Patient's  Rating of General Health  Very good  -KR      Hand Dominance  right-handed  -KR      Occupation/sports/leisure activities  /golf, walking  -KR      Patient seeing anyone else for problem(s)?  No  -KR      How has patient tried to help current problem?  Sought medical attention  -KR      Related/Recent Hospitalizations  Yes  -KR      Date of Hospitalization  19  -KR      Surgery/Hospitalization  Yes  -KR      Are you or can you be pregnant  No  -KR         Pain     What Performance Factors Make the Current Problem(s) WORSE?  laughing, coughing, sneeing, running water  -KR      What Performance Factors Make the Current Problem(s) BETTER?  Unkown   -KR      Is your sleep disturbed?  Yes  -KR      Is medication used to assist with sleep?  No  -KR      Total hours of sleep per night  4-5   -KR         Fall Risk Assessment    Any falls in the past year:  No  -KR         Daily Activities    Primary Language  English  -KR      Are you able to read  Yes  -KR      Are you able to write  Yes  -KR      How does patient learn best?  Listening;Reading;Demonstration;Pictures/Video  -KR      Teaching needs identified  Home Exercise Program;Management of Condition  -KR      Barriers to learning  None  -KR      Pt Participated in POC and Goals  Yes  -KR         Safety    Are you being hurt, hit, or frightened by anyone at home or in your life?  No  -KR      Are you being neglected by a caregiver  No  -KR        User Key  (r) = Recorded By, (t) = Taken By, (c) = Cosigned By    Initials Name Provider Type    Teresa Donnelly, PT DPT Physical Therapist          Pelvic Health     Row Name 20 1400             Pregnancy Questions    Number of Pregnancies  4  -KR      Number of Miscarriages  1  -KR      Number of Children  2  -KR      What is the birth weight of your largest child?  3.714 kg (8 lb 3 oz)  -KR      Type of Previous Deliveries  ;Other (comment)   -KR      Are you currently  exercising?  No  -KR      Pregnancy Comments  gestational diabetes, preclampsia  -KR         Pelvic Floor Muscle    Patient/Parent/Guardian Consented to Internal Pelvic Floor Exam  Yes  -KR      Strength (Right)  2: Squeeze no lift  -KR      Strength (Left)  1: Trace  -KR      Symmetry of Sustained Maximal Contraction  Right stronger than left  -KR      Endurance (Ability to Hold Maximal Contraction)  4 sec  -KR      # of Reps of Maximal Contractions while Maintaining Endurance and Strength  2 sets  -KR      Fast Contraction (# of 1 sec contractions performed)  5  -KR      Internal Pelvic Floor Comments  mild tenderness at 4-5 O'clock on pelvic clock  -KR      External Pelvic Floor Comments  No abrasions or gapping of introitus  -KR         Observations    Perineal Observation Performed?  Yes  -KR         Pelvic Floor    Overflow from Adjacent Muscles  Gluteus Abdoul Adductors  -KR         General ROM    GENERAL ROM COMMENTS  Bjorn hip IR/ER WNL  -KR         MMT (Manual Muscle Testing)    Rt Lower Ext  Rt Hip Flexion;Rt Hip Extension;Rt Hip ABduction  -KR      Lt Lower Ext  Lt Hip Flexion;Lt Hip ABduction;Lt Hip Extension  -KR         MMT Right Lower Ext    Rt Hip Flexion MMT, Gross Movement  (4+/5) good plus  -KR      Rt Hip Extension MMT, Gross Movement  (4/5) good  -KR      Rt Hip ABduction MMT, Gross Movement  (4/5) good  -KR         MMT Left Lower Ext    Lt Hip Flexion MMT, Gross Movement  (4+/5) good plus  -KR      Lt Hip Extension MMT, Gross Movement  (4-/5) good minus  -KR      Lt Hip ABduction MMT, Gross Movement  (4-/5) good minus  -KR        User Key  (r) = Recorded By, (t) = Taken By, (c) = Cosigned By    Initials Name Provider Type    Teresa Donnelly, PT DPT Physical Therapist                       PT Assessment/Plan     Row Name 01/29/20 9539          PT Assessment    Functional Limitations  Limitation in home management;Limitations in community activities;Limitations in functional capacity and  performance;Performance in leisure activities;Performance in sport activities  -KR     Impairments  Impaired muscle endurance;Muscle strength;Coordination  -KR     Assessment Comments  Mrs. Veras presents with signs and symptoms of stress incontinence and urinary urgency.  She demonstrates decreased strength of levator ani musculature bilaterally, decreased tiiming and endurance of pelvic floor musculature and decreased hip strength globally following a  on 19.  She denies any incontinence prior to .  She has some mild tenderness in the pelvic floor at 4-5 O'Clock on the pelvic clock. She has not had sexual intercourse since childbirth and denies constipation.  She is agreeable to the treatment plan.  -KR     Please refer to paper survey for additional self-reported information  Yes  -KR     Rehab Potential  Good  -KR     Patient/caregiver participated in establishment of treatment plan and goals  Yes  -KR     Patient would benefit from skilled therapy intervention  Yes  -KR        PT Plan    Predicted Duration of Therapy Intervention (Therapy Eval)  every 2 weeks for 12 sessions  -KR     Planned CPT's?  PT EVAL LOW COMPLEXITY: 17349;PT RE-EVAL: 94453;PT THER PROC EA 15 MIN: 34838;PT THER ACT EA 15 MIN: 96068;PT MANUAL THERAPY EA 15 MIN: 32646;PT ELECTRICAL STIM ATTD EA 15 MIN: 55830  -KR     PT Plan Comments  Will begin with HEP to increase strength, timing and endurance of pelvic floor musculature adding hip/core stabilization and strengthening at next session.  Progression of exercise will be determined by reassessment at each session.  -KR       User Key  (r) = Recorded By, (t) = Taken By, (c) = Cosigned By    Initials Name Provider Type    KR Teresa Katz, PT DPT Physical Therapist            OP Exercises     Row Name 20 1400             Total Minutes    64846 - PT Therapeutic Exercise Minutes  15  -KR         Exercise 1    Exercise Name 1  PFM sustained contraction  -KR      Cueing 1   Verbal;Tactile  -KR      Sets 1  2  -KR      Reps 1  6  -KR      Time 1  5 sec hold  -KR      Additional Comments  inconsistent  -KR         Exercise 2    Exercise Name 2  PFM quick contract/relax  -KR      Cueing 2  Verbal;Tactile  -KR      Sets 2  2  -KR      Reps 2  5  -KR         Exercise 3    Exercise Name 3  TA activation  -KR      Cueing 3  Verbal;Tactile  -KR      Sets 3  1  -KR      Reps 3  10  -KR      Time 3  6 sec holds  -KR      Additional Comments  Practiced TA and PFM contractions together after this education piece  -KR        User Key  (r) = Recorded By, (t) = Taken By, (c) = Cosigned By    Initials Name Provider Type    Teresa Donnelly, PT DPT Physical Therapist                      PT OP Goals     Row Name 01/29/20 1350          PT Short Term Goals    STG Date to Achieve  02/28/20  -KR     STG 1  Patient will begin a home exercise program to accelerate the recovery process  -KR     STG 1 Progress  New  -KR     STG 2  Patient will have an increase in bilateral levator ani strength to 3-/5 grade  -KR     STG 2 Progress  New  -KR     STG 3  Patient will have a reduction in frequency to 7 times per day  -KR     STG 3 Progress  New  -KR        Long Term Goals    LTG Date to Achieve  03/29/20  -KR     LTG 1  Patient will urinate 5 - 7 times during the day and 0 - 1 time per night  -KR     LTG 1 Progress  New  -KR     LTG 2  Patient will have 0 - 1 wet episodes in a 3 week period.  -KR     LTG 2 Progress  New  -KR     LTG 3  Urgency will be resolved  -KR     LTG 3 Progress  New  -KR     LTG 4  Patient will have an increase in levator ani strength juan luis. to 4/5 - 4+/5  -KR     LTG 4 Progress  New  -KR     LTG 5  Patient will be able to perform 12 contract/relax in a 10 second period indicating normal timing and coordination of pelvic floor musculature  -KR     LTG 5 Progress  New  -KR     LTG 6  Patient will be instructed in a long term exercise program to continue to make gains following discharge  -KR      LTG 6 Progress  New  -KR     LTG 7  Patient will have an increase in hip strength (flex/abd/ext) juan luis. to a 4+/5 - 5/5 grade  -KR     LTG 7 Progress  New  -KR        Time Calculation    PT Goal Re-Cert Due Date  02/28/20  -KR       User Key  (r) = Recorded By, (t) = Taken By, (c) = Cosigned By    Initials Name Provider Type    Teresa Donnelly, PT DPT Physical Therapist          Therapy Education  Education Details: Education provided on anatomy and function of pelvic floor and HEP.  Given: HEP, Symptoms/condition management  Program: New  How Provided: Verbal, Demonstration, Written  Provided to: Patient  Level of Understanding: Verbalized, Demonstrated               Time Calculation:                  Teresa Katz PT DPT  1/29/2020

## 2020-02-10 ENCOUNTER — TREATMENT (OUTPATIENT)
Dept: PHYSICAL THERAPY | Facility: CLINIC | Age: 30
End: 2020-02-10

## 2020-02-10 DIAGNOSIS — R39.15 URINARY URGENCY: ICD-10-CM

## 2020-02-10 DIAGNOSIS — N39.3 SUI (STRESS URINARY INCONTINENCE, FEMALE): Primary | ICD-10-CM

## 2020-02-10 PROCEDURE — 97110 THERAPEUTIC EXERCISES: CPT | Performed by: PHYSICAL THERAPIST

## 2020-02-10 NOTE — PROGRESS NOTES
Outpatient Physical Therapy Pelvic Health Treatment Note       Patient Name: Kate Veras  : 1990  MRN: [id number]  Today's Date: 2/10/2020        Visit Date: 02/10/2020    Visit Dx:    ICD-10-CM ICD-9-CM   1. HANNAH (stress urinary incontinence, female) N39.3 625.6   2. Urinary urgency R39.15 788.63       Patient Active Problem List   Diagnosis   • Ectopic pregnancy   • Pregnancy   • Dehydration        Pelvic Health     Row Name 02/10/20 1118             Pelvic Floor Muscle    Patient/Parent/Guardian Consented to Internal Pelvic Floor Exam  Yes  -KR      Strength (Right)  2: Squeeze no lift  -KR      Strength (Left)  1: Trace  -KR      Symmetry of Sustained Maximal Contraction  Right stronger than left  -KR      Endurance (Ability to Hold Maximal Contraction)  -- R 6 sec; L 3 sec  -KR      # of Reps of Maximal Contractions while Maintaining Endurance and Strength  2 sets  -KR      Fast Contraction (# of 1 sec contractions performed)  -- R 6; L 7  -KR        User Key  (r) = Recorded By, (t) = Taken By, (c) = Cosigned By    Initials Name Provider Type    Teresa Donnelly, PT DPT Physical Therapist                       PT Assessment/Plan     Row Name 02/10/20 1118          PT Assessment    Assessment Comments  She notices that frequency of leakage has decreased and she is able to hold the urine a little longer instead of running to the bathroom now.  Levator ani strength is 2+/5 on R and 1+/5 on L.  Quick stretch was performed left pelvic floor musculature with primary emphasis on levator ani with minimal increase in contraction.    -KR        PT Plan    PT Plan Comments  Will continue with exercise program to increase strength, timinng and endurance of the pelvic floor muscles as well as core and hip strengthening.   -KR       User Key  (r) = Recorded By, (t) = Taken By, (c) = Cosigned By    Initials Name Provider Type    Teresa Donnelly, PT DPT Physical Therapist            OP Exercises     Row Name  02/10/20 1118             Subjective Comments    Subjective Comments  Patient reports leakage is not as freqent and she feels like she can hold it longer without running to the bathroom  -KR         Total Minutes    34448 - PT Therapeutic Exercise Minutes  40  -KR         Exercise 1    Exercise Name 1  PFM sustained contraction  -KR      Cueing 1  Verbal;Tactile  -KR      Sets 1  2  -KR      Reps 1  8  -KR      Time 1  7 sec hold R  -KR         Exercise 2    Exercise Name 2  PFM quick contract/relax  -KR      Cueing 2  Verbal;Tactile  -KR      Sets 2  2  -KR      Reps 2  R 6; L 7  -KR         Exercise 3    Exercise Name 3  TA activation  -KR      Cueing 3  Verbal;Tactile  -KR      Sets 3  1  -KR      Reps 3  10  -KR      Time 3  6 sec holds  -KR         Exercise 4    Exercise Name 4  Isometric hip adduction against ball  -KR      Cueing 4  Verbal;Tactile  -KR      Sets 4  1  -KR      Reps 4  10  -KR      Time 4  6 sec hold  -KR      Additional Comments  TA and PFM activation  -KR         Exercise 5    Exercise Name 5  Hip lift w/resisted hip Abd/ER  -KR      Cueing 5  Verbal;Tactile  -KR      Sets 5  1  -KR      Reps 5  10  -KR      Additional Comments  TA and PFM activation; blue band  -KR         Exercise 6    Exercise Name 6  SL gluteus medius strengthening  -KR      Cueing 6  Verbal;Tactile  -KR      Sets 6  1  -KR      Reps 6  10 each  -KR      Additional Comments  TA activation  -KR        User Key  (r) = Recorded By, (t) = Taken By, (c) = Cosigned By    Initials Name Provider Type    Teresa Donnelly, PT DPT Physical Therapist                      PT OP Goals     Row Name 02/10/20 1118          PT Short Term Goals    STG Date to Achieve  02/28/20  -KR     STG 1  Patient will begin a home exercise program to accelerate the recovery process  -KR     STG 1 Progress  Met  -KR     STG 2  Patient will have an increase in bilateral levator ani strength to 3-/5 grade  -KR     STG 2 Progress  Ongoing  -KR     STG 3   Patient will have a reduction in frequency to 7 times per day  -     STG 3 Progress  Ongoing  -KR        Long Term Goals    LTG Date to Achieve  03/29/20  -KR     LTG 1  Patient will urinate 5 - 7 times during the day and 0 - 1 time per night  -KR     LTG 1 Progress  Ongoing  -KR     LTG 2  Patient will have 0 - 1 wet episodes in a 3 week period.  -KR     LTG 2 Progress  Ongoing  -KR     LTG 3  Urgency will be resolved  -KR     LTG 3 Progress  Ongoing  -KR     LTG 4  Patient will have an increase in levator ani strength juan luis. to 4/5 - 4+/5  -KR     LTG 4 Progress  Ongoing  -KR     LTG 5  Patient will be able to perform 12 contract/relax in a 10 second period indicating normal timing and coordination of pelvic floor musculature  -     LTG 5 Progress  Ongoing  -KR     LTG 6  Patient will be instructed in a long term exercise program to continue to make gains following discharge  -     LTG 6 Progress  Ongoing  -KR     LTG 7  Patient will have an increase in hip strength (flex/abd/ext) juan luis. to a 4+/5 - 5/5 grade  -     LTG 7 Progress  Ongoing  -KR        Time Calculation    PT Goal Re-Cert Due Date  02/28/20  -       User Key  (r) = Recorded By, (t) = Taken By, (c) = Cosigned By    Initials Name Provider Type    Teresa Donnelly, PT DPT Physical Therapist           Therapy Education  Education Details: Instructed in [place]   Given: HEP, Posture/body mechanics  Program: Reinforced, Progressed  How Provided: Verbal, Demonstration, Written  Provided to: Patient  Level of Understanding: Verbalized, Demonstrated              Time Calculation:   Total Timed Code Minutes- PT: 40 minute(s)                Teresa Katz PT DPT  2/10/2020

## 2020-02-24 ENCOUNTER — TREATMENT (OUTPATIENT)
Dept: PHYSICAL THERAPY | Facility: CLINIC | Age: 30
End: 2020-02-24

## 2020-02-24 DIAGNOSIS — R39.15 URINARY URGENCY: ICD-10-CM

## 2020-02-24 DIAGNOSIS — N39.3 SUI (STRESS URINARY INCONTINENCE, FEMALE): Primary | ICD-10-CM

## 2020-02-24 PROCEDURE — 97110 THERAPEUTIC EXERCISES: CPT | Performed by: PHYSICAL THERAPIST

## 2020-02-24 NOTE — PROGRESS NOTES
Outpatient Physical Therapy Pelvic Health Treatment Note       Patient Name: Kate Veras  : 1990  MRN: [id number]  Today's Date: 2020        Visit Date: 2020    Visit Dx:    ICD-10-CM ICD-9-CM   1. HANNAH (stress urinary incontinence, female) N39.3 625.6   2. Urinary urgency R39.15 788.63       Patient Active Problem List   Diagnosis   • Ectopic pregnancy   • Pregnancy   • Dehydration        Pelvic Health     Row Name 20 1400             Pelvic Floor Muscle    Patient/Parent/Guardian Consented to Internal Pelvic Floor Exam  Yes  -KR      Strength (Right)  3: Squeeze with/without lift  -KR      Strength (Left)  2: Squeeze no lift  -KR      Symmetry of Sustained Maximal Contraction  Right stronger than left  -KR      Endurance (Ability to Hold Maximal Contraction)  -- R 6 sec; L 4 sec  -KR      # of Reps of Maximal Contractions while Maintaining Endurance and Strength  2 sets  -KR      Fast Contraction (# of 1 sec contractions performed)  -- R 5; L 8  -KR        User Key  (r) = Recorded By, (t) = Taken By, (c) = Cosigned By    Initials Name Provider Type    Teresa Donnelly, PT DPT Physical Therapist                       PT Assessment/Plan     Row Name 20 1430          PT Assessment    Assessment Comments  Leakage has decreased to once in a 2 week period and urgency is almost non-existent per patient report.  She continues to demonstrate decreased timing, endurance and strength of pelvic floor musculature putting her at risk for prolapse.  -KR        PT Plan    PT Plan Comments  Will reassess at next visit to progress HEP.  -KR       User Key  (r) = Recorded By, (t) = Taken By, (c) = Cosigned By    Initials Name Provider Type    Teresa Donnelly, PT DPT Physical Therapist            OP Exercises     Row Name 20 1430 20 1400          Subjective Comments    Subjective Comments  --  Patient reports she has had only one wet episode and urge has decreased.  She reports water  intake is better.  She does not wear any protective pad.  -KR        Total Minutes    39761 - PT Therapeutic Exercise Minutes  40  -KR  --  -KR        Exercise 1    Exercise Name 1  --  PFM sustained contraction  -KR     Cueing 1  --  Verbal;Tactile  -KR     Sets 1  --  2  -KR     Reps 1  --  8  -KR     Time 1  --  7 sec hold R  -KR        Exercise 2    Exercise Name 2  --  PFM quick contract/relax  -KR     Cueing 2  --  Verbal;Tactile  -KR     Sets 2  --  2  -KR     Reps 2  --  R 6; L 7  -KR        Exercise 3    Exercise Name 3  --  TA activation  -KR     Cueing 3  --  Verbal;Tactile  -KR     Sets 3  --  1  -KR     Reps 3  --  10  -KR     Time 3  --  6 sec holds  -KR        Exercise 4    Exercise Name 4  --  Isometric hip adduction against ball  -KR     Cueing 4  --  Verbal;Tactile  -KR     Sets 4  --  1  -KR     Reps 4  --  10  -KR     Time 4  --  6 sec hold  -KR        Exercise 5    Exercise Name 5  --  Hip lift w/resisted hip Abd/ER  -KR     Cueing 5  --  Verbal;Tactile  -KR     Sets 5  --  1  -KR     Reps 5  --  10  -KR        Exercise 6    Exercise Name 6  --  SL gluteus medius strengthening  -KR     Cueing 6  --  Verbal;Tactile  -KR     Sets 6  --  1  -KR     Reps 6  --  10 each  -KR        Exercise 7    Exercise Name 7  --  PFM contraction w/ sit to stand  -KR     Cueing 7  --  Verbal  -KR     Sets 7  --  1  -KR     Reps 7  --  8  -KR       User Key  (r) = Recorded By, (t) = Taken By, (c) = Cosigned By    Initials Name Provider Type    Teresa Donnelly, PT DPT Physical Therapist                      PT OP Goals     Row Name 02/24/20 1430          PT Short Term Goals    STG Date to Achieve  02/28/20  -KR     STG 1  Patient will begin a home exercise program to accelerate the recovery process  -KR     STG 1 Progress  Met  -KR     STG 2  Patient will have an increase in bilateral levator ani strength to 3-/5 grade  -KR     STG 2 Progress  Progressing  -KR     STG 2 Progress Comments  R 3-/5; L 2+/5  -KR     STG  3  Patient will have a reduction in frequency to 7 times per day  -     STG 3 Progress  Ongoing  -KR        Long Term Goals    LTG Date to Achieve  03/29/20  -KR     LTG 1  Patient will urinate 5 - 7 times during the day and 0 - 1 time per night  -KR     LTG 1 Progress  Ongoing  -KR     LTG 2  Patient will have 0 - 1 wet episodes in a 3 week period.  -KR     LTG 2 Progress  Progressing  -KR     LTG 3  Urgency will be resolved  -KR     LTG 3 Progress  Progressing  -KR     LTG 4  Patient will have an increase in levator ani strength juan luis. to 4/5 - 4+/5  -KR     LTG 4 Progress  Ongoing  -KR     LTG 5  Patient will be able to perform 12 contract/relax in a 10 second period indicating normal timing and coordination of pelvic floor musculature  -KR     LTG 5 Progress  Ongoing  -KR     LTG 6  Patient will be instructed in a long term exercise program to continue to make gains following discharge  -     LTG 6 Progress  Ongoing  -KR     LTG 7  Patient will have an increase in hip strength (flex/abd/ext) juan luis. to a 4+/5 - 5/5 grade  -     LTG 7 Progress  Ongoing  -KR        Time Calculation    PT Goal Re-Cert Due Date  02/28/20  -       User Key  (r) = Recorded By, (t) = Taken By, (c) = Cosigned By    Initials Name Provider Type    Teresa Donnelly PT DPT Physical Therapist           Therapy Education  Education Details: Added PFM contraction during sit to stand  Given: HEP  Program: Progressed  How Provided: Verbal  Provided to: Patient  Level of Understanding: Verbalized              Time Calculation:   Total Timed Code Minutes- PT: 40 minute(s)                Teresa Katz PT DPT  2/24/2020

## 2020-03-09 ENCOUNTER — TREATMENT (OUTPATIENT)
Dept: PHYSICAL THERAPY | Facility: CLINIC | Age: 30
End: 2020-03-09

## 2020-03-09 DIAGNOSIS — N39.3 SUI (STRESS URINARY INCONTINENCE, FEMALE): Primary | ICD-10-CM

## 2020-03-09 DIAGNOSIS — R39.15 URINARY URGENCY: ICD-10-CM

## 2020-03-09 PROCEDURE — 97110 THERAPEUTIC EXERCISES: CPT | Performed by: PHYSICAL THERAPIST

## 2020-03-09 NOTE — PROGRESS NOTES
Outpatient Physical Therapy Pelvic Health Progress Note       Patient Name: Kate Veras  : 1990  MRN: [id number]  Today's Date: 3/9/2020        Visit Date: 2020    Visit Dx:    ICD-10-CM ICD-9-CM   1. HANNAH (stress urinary incontinence, female) N39.3 625.6   2. Urinary urgency R39.15 788.63       Patient Active Problem List   Diagnosis   • Ectopic pregnancy   • Pregnancy   • Dehydration        Pelvic Health     Row Name 20 1300             Pelvic Floor Muscle    Patient/Parent/Guardian Consented to Internal Pelvic Floor Exam  Yes  -KR      Strength (Right)  4: Strong contraction  -KR      Strength (Left)  3: Squeeze with/without lift  -KR      Symmetry of Sustained Maximal Contraction  Right stronger than left  -KR      Endurance (Ability to Hold Maximal Contraction)  -- r 8 sec; L 5 sec  -KR      # of Reps of Maximal Contractions while Maintaining Endurance and Strength  2 sets  -KR      Fast Contraction (# of 1 sec contractions performed)  9  -KR         MMT Right Lower Ext    Rt Hip Flexion MMT, Gross Movement  (5/5) normal  -KR      Rt Hip Extension MMT, Gross Movement  (5/5) normal  -KR      Rt Hip ABduction MMT, Gross Movement  (4+/5) good plus  -KR         MMT Left Lower Ext    Lt Hip Flexion MMT, Gross Movement  (5/5) normal  -KR      Lt Hip Extension MMT, Gross Movement  (5/5) normal  -KR      Lt Hip ABduction MMT, Gross Movement  (5/5) normal  -KR        User Key  (r) = Recorded By, (t) = Taken By, (c) = Cosigned By    Initials Name Provider Type    Teresa Donnelly, PT DPT Physical Therapist                       PT Assessment/Plan     Row Name 20 1300          PT Assessment    Functional Limitations  Limitation in home management;Limitations in community activities;Limitations in functional capacity and performance;Performance in leisure activities;Performance in sport activities  -KR     Impairments  Impaired muscle endurance;Muscle strength;Coordination  -KR      Assessment Comments  Micki is continuing to make consistent progress.  The right pelvic floor has more strength, timing and endurance than the left pelvic floor, however the left is improving and no longer tender to palpation.  Wet episodes have decreased to one every 2-3 weeks and urgency is 90% improved.  She has met all short term goals and is progressing toward all long term goals.   -KR     Rehab Potential  Good  -KR     Patient/caregiver participated in establishment of treatment plan and goals  Yes  -KR     Patient would benefit from skilled therapy intervention  Yes  -KR        PT Plan    Predicted Duration of Therapy Intervention (Therapy Eval)  3 sessions  -KR     Planned CPT's?  PT RE-EVAL: 74333;PT THER PROC EA 15 MIN: 02914;PT THER ACT EA 15 MIN: 11308;PT MANUAL THERAPY EA 15 MIN: 32797;PT NEUROMUSC RE-EDUCATION EA 15 MIN: 15307  -KR     PT Plan Comments  Will perform internal pelvic floor assessment at next session to determine progression of HEP.  -KR       User Key  (r) = Recorded By, (t) = Taken By, (c) = Cosigned By    Initials Name Provider Type    Teresa Donnelly, PT DPT Physical Therapist            OP Exercises     Row Name 03/09/20 1300             Subjective Comments    Subjective Comments  Patient reports she has had only one wet episode but she believes it is due to waiting too long to go to the bathroom.  Urgency has improved but can be triggered by running water but there are times it goes away.   -KR         Total Minutes    94487 - PT Therapeutic Exercise Minutes  30  -KR         Exercise 1    Exercise Name 1  PFM sustained contraction  -KR      Cueing 1  Verbal;Tactile  -KR      Sets 1  2  -KR      Reps 1  8  -KR      Time 1  R 8 sec; L 5 sec  -KR         Exercise 2    Exercise Name 2  PFM quick contract/relax  -KR      Cueing 2  Verbal;Tactile  -KR      Sets 2  2  -KR      Reps 2  9  -KR         Exercise 3    Exercise Name 3  TA activation  -KR      Cueing 3  Verbal;Tactile  -KR       Sets 3  1  -KR      Reps 3  10  -KR      Time 3  6 sec holds  -KR         Exercise 4    Exercise Name 4  Isometric hip adduction against ball  -KR      Cueing 4  Verbal;Tactile  -KR      Sets 4  1  -KR      Reps 4  10  -KR      Time 4  6 sec hold  -KR         Exercise 5    Exercise Name 5  Hip lift w/resisted hip Abd/ER  -KR      Cueing 5  Verbal;Tactile  -KR      Sets 5  1  -KR      Reps 5  10  -KR         Exercise 6    Exercise Name 6  Step overws   -KR      Cueing 6  Verbal;Tactile  -KR      Additional Comments  30 ft each  -KR         Exercise 7    Exercise Name 7  PFM contraction w/ sit to stand  -KR      Cueing 7  Verbal  -KR      Sets 7  1  -KR      Reps 7  8  -KR        User Key  (r) = Recorded By, (t) = Taken By, (c) = Cosigned By    Initials Name Provider Type    eTresa Donnelly, PT DPT Physical Therapist                      PT OP Goals     Row Name 03/09/20 1300          PT Short Term Goals    STG Date to Achieve  02/28/20  -KR     STG 1  Patient will begin a home exercise program to accelerate the recovery process  -KR     STG 1 Progress  Met  -KR     STG 2  Patient will have an increase in bilateral levator ani strength to 3-/5 grade  -KR     STG 2 Progress  Met  -KR     STG 3  Patient will have a reduction in frequency to 7 times per day  -KR     STG 3 Progress  Met  -KR        Long Term Goals    LTG Date to Achieve  03/29/20  -KR     LTG 1  Patient will urinate 5 - 7 times during the day and 0 - 1 time per night  -KR     LTG 1 Progress  Met  -KR     LTG 2  Patient will have 0 - 1 wet episodes in a 3 week period.  -KR     LTG 2 Progress  Progressing  -KR     LTG 3  Urgency will be resolved  -KR     LTG 3 Progress  Progressing  -KR     LTG 4  Patient will have an increase in levator ani strength juan luis. to 4/5 - 4+/5  -KR     LTG 4 Progress  Progressing  -KR     LTG 5  Patient will be able to perform 12 contract/relax in a 10 second period indicating normal timing and coordination of pelvic floor  musculature  -KR     LTG 5 Progress  Progressing  -KR     LTG 5 Progress Comments  9 contractions  -KR     LTG 6  Patient will be instructed in a long term exercise program to continue to make gains following discharge  -KR     LTG 6 Progress  Ongoing  -KR     LTG 7  Patient will have an increase in hip strength (flex/abd/ext) juan luis. to a 4+/5 - 5/5 grade  -KR     LTG 7 Progress  Met  -KR        Time Calculation    PT Goal Re-Cert Due Date  02/28/20  -       User Key  (r) = Recorded By, (t) = Taken By, (c) = Cosigned By    Initials Name Provider Type    Teresa Donnelly, PT DPT Physical Therapist           Therapy Education  Education Details: Added step overs for glut med strengthening  Given: HEP  Program: Progressed  How Provided: Verbal, Demonstration, Written  Provided to: Patient  Level of Understanding: Verbalized, Demonstrated              Time Calculation:   Total Timed Code Minutes- PT: 30 minute(s)                Teresa Katz PT DPT  3/9/2020

## 2020-08-06 ENCOUNTER — TELEMEDICINE (OUTPATIENT)
Dept: FAMILY MEDICINE CLINIC | Facility: TELEHEALTH | Age: 30
End: 2020-08-06

## 2020-08-06 DIAGNOSIS — J30.9 ALLERGIC RHINITIS, UNSPECIFIED SEASONALITY, UNSPECIFIED TRIGGER: ICD-10-CM

## 2020-08-06 DIAGNOSIS — J01.20 ACUTE ETHMOIDAL SINUSITIS, RECURRENCE NOT SPECIFIED: Primary | ICD-10-CM

## 2020-08-06 PROCEDURE — 99213 OFFICE O/P EST LOW 20 MIN: CPT | Performed by: NURSE PRACTITIONER

## 2020-08-06 RX ORDER — DOXYCYCLINE 100 MG/1
100 CAPSULE ORAL 2 TIMES DAILY
Qty: 20 CAPSULE | Refills: 0 | Status: SHIPPED | OUTPATIENT
Start: 2020-08-06 | End: 2020-08-16

## 2020-08-06 RX ORDER — LORATADINE 10 MG/1
10 TABLET ORAL DAILY
Qty: 30 TABLET | Refills: 0 | Status: SHIPPED | OUTPATIENT
Start: 2020-08-06 | End: 2021-07-22

## 2020-08-06 RX ORDER — FLUTICASONE PROPIONATE 50 MCG
2 SPRAY, SUSPENSION (ML) NASAL DAILY
Qty: 1 BOTTLE | Refills: 0 | Status: SHIPPED | OUTPATIENT
Start: 2020-08-06 | End: 2021-07-22

## 2020-08-06 NOTE — PROGRESS NOTES
CHIEF COMPLAINT  No chief complaint on file.      --Symptoms:   --In the last 14 day, have you had contact with anyone who is ill, has shown any of the symptoms listed above and/or has been diagnosed with 2019 Novel Coronavirus? This includes any immediate household member but excludes any patients with whom you have been in contact within your normal work duties wearing proper PPE, if you are a healthcare worker:    SIMEON Veras is a 30 y.o. female  presents with complaint of 1 week history of sore throat, chest congestion, productive with green sputum, headache, green nasal discharge, sneezing, sinus pain/pressure; denies fever, wheezing, shortness of breath, ear pain    COVID-19 test was negative--result came back today    Review of Systems   Constitutional: Negative for activity change, appetite change, chills, fatigue and fever.   HENT: Positive for congestion, postnasal drip, rhinorrhea, sinus pressure, sinus pain and sore throat. Negative for ear pain.    Respiratory: Positive for cough. Negative for chest tightness, shortness of breath and wheezing.    Neurological: Positive for headaches. Negative for dizziness.   All other systems reviewed and are negative.      Past Medical History:   Diagnosis Date   • Gestational diabetes    • Heart murmur     was told wouldn't cause problems   • Pre-eclampsia        No family history on file.    Social History     Socioeconomic History   • Marital status:      Spouse name: Not on file   • Number of children: Not on file   • Years of education: Not on file   • Highest education level: Not on file   Tobacco Use   • Smoking status: Never Smoker   • Smokeless tobacco: Never Used   Substance and Sexual Activity   • Alcohol use: No     Frequency: Never   • Drug use: No   • Sexual activity: Defer         Breastfeeding No     PHYSICAL EXAM  Physical Exam   Constitutional: She is oriented to person, place, and time. She appears well-developed and well-nourished.    HENT:   Head: Normocephalic and atraumatic.   Right Ear: Hearing and external ear normal.   Left Ear: Hearing and external ear normal.   Directed pt exam--ethmoidal sinus tenderness with palpation; no frontal/maxillary tenderness   Pulmonary/Chest: Effort normal.  No respiratory distress.  Lymphadenopathy:   Directed pt exam--mild tenderness of anterior cervical nodes   Neurological: She is alert and oriented to person, place, and time.         Diagnoses and all orders for this visit:    Acute ethmoidal sinusitis, recurrence not specified  -     doxycycline (MONODOX) 100 MG capsule; Take 1 capsule by mouth 2 (Two) Times a Day for 10 days.  -     fluticasone (FLONASE) 50 MCG/ACT nasal spray; 2 sprays into the nostril(s) as directed by provider Daily.  --complete doxycycline as prescribed  --start Flonase daily--may stop if symptoms resolve  --tylenol or ibuprofen for headache/fever    Allergic rhinitis, unspecified seasonality, unspecified trigger  -     fluticasone (FLONASE) 50 MCG/ACT nasal spray; 2 sprays into the nostril(s) as directed by provider Daily.  -     loratadine (Claritin) 10 MG tablet; Take 1 tablet by mouth Daily.  --start daily allergy medication until symptoms resolve    **if at any time, experiences fever AND/OR worsening cough AND/OR shortness of breath, has been advised to go to nearest urgent care or emergency department for evaluation AND/OR testing    **if no improvement 7-10 days, but not worsening, may schedule a f/u virtual visit or E-visit      FOLLOW-UP  As discussed with PCP/Virtual Care if no improvement or Urgent Care/Emergency Department if worsening of symptoms    Patient verbalizes understanding of medication dosage, comfort measures, instructions for treatment and follow-up.    ANG Lozada  08/06/2020  3:10 PM    This visit was performed via Telehealth.  This patient has been instructed to follow-up with their primary care provider if their symptoms worsen or the  treatment provided does not resolve their illness.

## 2020-08-06 NOTE — PATIENT INSTRUCTIONS
Allergic Rhinitis, Adult  Allergic rhinitis is an allergic reaction that affects the mucous membrane inside the nose. It causes sneezing, a runny or stuffy nose, and the feeling of mucus going down the back of the throat (postnasal drip). Allergic rhinitis can be mild to severe.  There are two types of allergic rhinitis:  · Seasonal. This type is also called hay fever. It happens only during certain seasons.  · Perennial. This type can happen at any time of the year.  What are the causes?  This condition happens when the body's defense system (immune system) responds to certain harmless substances called allergens as though they were germs.   Seasonal allergic rhinitis is triggered by pollen, which can come from grasses, trees, and weeds. Perennial allergic rhinitis may be caused by:  · House dust mites.  · Pet dander.  · Mold spores.  What are the signs or symptoms?  Symptoms of this condition include:  · Sneezing.  · Runny or stuffy nose (nasal congestion).  · Postnasal drip.  · Itchy nose.  · Tearing of the eyes.  · Trouble sleeping.  · Daytime sleepiness.  How is this diagnosed?  This condition may be diagnosed based on:  · Your medical history.  · A physical exam.  · Tests to check for related conditions, such as:  ? Asthma.  ? Pink eye.  ? Ear infection.  ? Upper respiratory infection.  · Tests to find out which allergens trigger your symptoms. These may include skin or blood tests.  How is this treated?  There is no cure for this condition, but treatment can help control symptoms. Treatment may include:  · Taking medicines that block allergy symptoms, such as antihistamines. Medicine may be given as a shot, nasal spray, or pill.  · Avoiding the allergen.  · Desensitization. This treatment involves getting ongoing shots until your body becomes less sensitive to the allergen. This treatment may be done if other treatments do not help.  · If taking medicine and avoiding the allergen does not work, new, stronger  medicines may be prescribed.  Follow these instructions at home:  · Find out what you are allergic to. Common allergens include smoke, dust, and pollen.  · Avoid the things you are allergic to. These are some things you can do to help avoid allergens:  ? Replace carpet with wood, tile, or vinyl yahir. Carpet can trap dander and dust.  ? Do not smoke. Do not allow smoking in your home.  ? Change your heating and air conditioning filter at least once a month.  ? During allergy season:  § Keep windows closed as much as possible.  § Plan outdoor activities when pollen counts are lowest. This is usually during the evening hours.  § When coming indoors, change clothing and shower before sitting on furniture or bedding.  · Take over-the-counter and prescription medicines only as told by your health care provider.  · Keep all follow-up visits as told by your health care provider. This is important.  Contact a health care provider if:  · You have a fever.  · You develop a persistent cough.  · You make whistling sounds when you breathe (you wheeze).  · Your symptoms interfere with your normal daily activities.  Get help right away if:  · You have shortness of breath.  Summary  · This condition can be managed by taking medicines as directed and avoiding allergens.  · Contact your health care provider if you develop a persistent cough or fever.  · During allergy season, keep windows closed as much as possible.  This information is not intended to replace advice given to you by your health care provider. Make sure you discuss any questions you have with your health care provider.  Document Released: 09/12/2002 Document Revised: 11/30/2018 Document Reviewed: 01/25/2018  Elsevier Patient Education © 2020 [employer]    Sinusitis, Adult  Sinusitis is inflammation of your sinuses. Sinuses are hollow spaces in the bones around your face. Your sinuses are located:  · Around your eyes.  · In the middle of your forehead.  · Behind  your nose.  · In your cheekbones.  Mucus normally drains out of your sinuses. When your nasal tissues become inflamed or swollen, mucus can become trapped or blocked. This allows bacteria, viruses, and fungi to grow, which leads to infection. Most infections of the sinuses are caused by a virus.  Sinusitis can develop quickly. It can last for up to 4 weeks (acute) or for more than 12 weeks (chronic). Sinusitis often develops after a cold.  What are the causes?  This condition is caused by anything that creates swelling in the sinuses or stops mucus from draining. This includes:  · Allergies.  · Asthma.  · Infection from bacteria or viruses.  · Deformities or blockages in your nose or sinuses.  · Abnormal growths in the nose (nasal polyps).  · Pollutants, such as chemicals or irritants in the air.  · Infection from fungi (rare).  What increases the risk?  You are more likely to develop this condition if you:  · Have a weak body defense system (immune system).  · Do a lot of swimming or diving.  · Overuse nasal sprays.  · Smoke.  What are the signs or symptoms?  The main symptoms of this condition are pain and a feeling of pressure around the affected sinuses. Other symptoms include:  · Stuffy nose or congestion.  · Thick drainage from your nose.  · Swelling and warmth over the affected sinuses.  · Headache.  · Upper toothache.  · A cough that may get worse at night.  · Extra mucus that collects in the throat or the back of the nose (postnasal drip).  · Decreased sense of smell and taste.  · Fatigue.  · A fever.  · Sore throat.  · Bad breath.  How is this diagnosed?  This condition is diagnosed based on:  · Your symptoms.  · Your medical history.  · A physical exam.  · Tests to find out if your condition is acute or chronic. This may include:  ? Checking your nose for nasal polyps.  ? Viewing your sinuses using a device that has a light (endoscope).  ? Testing for allergies or bacteria.  ? Imaging tests, such as an  MRI or CT scan.  In rare cases, a bone biopsy may be done to rule out more serious types of fungal sinus disease.  How is this treated?  Treatment for sinusitis depends on the cause and whether your condition is chronic or acute.  · If caused by a virus, your symptoms should go away on their own within 10 days. You may be given medicines to relieve symptoms. They include:  ? Medicines that shrink swollen nasal passages (topical intranasal decongestants).  ? Medicines that treat allergies (antihistamines).  ? A spray that eases inflammation of the nostrils (topical intranasal corticosteroids).  ? Rinses that help get rid of thick mucus in your nose (nasal saline washes).  · If caused by bacteria, your health care provider may recommend waiting to see if your symptoms improve. Most bacterial infections will get better without antibiotic medicine. You may be given antibiotics if you have:  ? A severe infection.  ? A weak immune system.  · If caused by narrow nasal passages or nasal polyps, you may need to have surgery.  Follow these instructions at home:  Medicines  · Take, use, or apply over-the-counter and prescription medicines only as told by your health care provider. These may include nasal sprays.  · If you were prescribed an antibiotic medicine, take it as told by your health care provider. Do not stop taking the antibiotic even if you start to feel better.  Hydrate and humidify    · Drink enough fluid to keep your urine pale yellow. Staying hydrated will help to thin your mucus.  · Use a cool mist humidifier to keep the humidity level in your home above 50%.  · Inhale steam for 10-15 minutes, 3-4 times a day, or as told by your health care provider. You can do this in the bathroom while a hot shower is running.  · Limit your exposure to cool or dry air.  Rest  · Rest as much as possible.  · Sleep with your head raised (elevated).  · Make sure you get enough sleep each night.  General instructions    · Apply a  warm, moist washcloth to your face 3-4 times a day or as told by your health care provider. This will help with discomfort.  · Wash your hands often with soap and water to reduce your exposure to germs. If soap and water are not available, use hand .  · Do not smoke. Avoid being around people who are smoking (secondhand smoke).  · Keep all follow-up visits as told by your health care provider. This is important.  Contact a health care provider if:  · You have a fever.  · Your symptoms get worse.  · Your symptoms do not improve within 10 days.  Get help right away if:  · You have a severe headache.  · You have persistent vomiting.  · You have severe pain or swelling around your face or eyes.  · You have vision problems.  · You develop confusion.  · Your neck is stiff.  · You have trouble breathing.  Summary  · Sinusitis is soreness and inflammation of your sinuses. Sinuses are hollow spaces in the bones around your face.  · This condition is caused by nasal tissues that become inflamed or swollen. The swelling traps or blocks the flow of mucus. This allows bacteria, viruses, and fungi to grow, which leads to infection.  · If you were prescribed an antibiotic medicine, take it as told by your health care provider. Do not stop taking the antibiotic even if you start to feel better.  · Keep all follow-up visits as told by your health care provider. This is important.  This information is not intended to replace advice given to you by your health care provider. Make sure you discuss any questions you have with your health care provider.  Document Released: 12/18/2006 Document Revised: 05/20/2019 Document Reviewed: 05/20/2019  Elsevier Patient Education © 2020 [employer]

## 2021-07-22 ENCOUNTER — OFFICE VISIT (OUTPATIENT)
Dept: OBSTETRICS AND GYNECOLOGY | Facility: CLINIC | Age: 31
End: 2021-07-22

## 2021-07-22 VITALS
HEIGHT: 60 IN | DIASTOLIC BLOOD PRESSURE: 90 MMHG | SYSTOLIC BLOOD PRESSURE: 142 MMHG | BODY MASS INDEX: 28.66 KG/M2 | WEIGHT: 146 LBS

## 2021-07-22 DIAGNOSIS — E66.3 OVERWEIGHT (BMI 25.0-29.9): ICD-10-CM

## 2021-07-22 DIAGNOSIS — Z01.419 WELL WOMAN EXAM WITH ROUTINE GYNECOLOGICAL EXAM: Primary | ICD-10-CM

## 2021-07-22 DIAGNOSIS — N94.3 PMS (PREMENSTRUAL SYNDROME): ICD-10-CM

## 2021-07-22 DIAGNOSIS — Z87.891 FORMER SMOKER: ICD-10-CM

## 2021-07-22 PROCEDURE — G0123 SCREEN CERV/VAG THIN LAYER: HCPCS | Performed by: OBSTETRICS & GYNECOLOGY

## 2021-07-22 PROCEDURE — 87624 HPV HI-RISK TYP POOLED RSLT: CPT | Performed by: OBSTETRICS & GYNECOLOGY

## 2021-07-22 PROCEDURE — 99395 PREV VISIT EST AGE 18-39: CPT | Performed by: OBSTETRICS & GYNECOLOGY

## 2021-07-22 RX ORDER — LORAZEPAM 1 MG/1
1 TABLET ORAL EVERY 6 HOURS PRN
COMMUNITY
Start: 2021-04-16

## 2021-07-22 RX ORDER — NORETHINDRONE ACETATE AND ETHINYL ESTRADIOL AND FERROUS FUMARATE 1MG-20(24)
1 KIT ORAL DAILY
Qty: 28 TABLET | Refills: 12 | Status: SHIPPED | OUTPATIENT
Start: 2021-07-22 | End: 2022-07-12 | Stop reason: SDUPTHER

## 2021-07-22 NOTE — PROGRESS NOTES
"Nichole Veras is a 31 y.o. female who presents for her routine annual exam. Overall, patient reports to be feeling \"good\".  Periods are regular every 28-30 days, lasting 6 days. Dysmenorrhea:mild, occurring throughout menses. Cyclic symptoms include moodiness and cramping, both before her period. No intermenstrual bleeding, spotting, or discharge.    Current contraception: none  Regular self breast exam: yes  History of abnormal Pap smear: yes - but only one, all have been normal since 2010  History of abnormal mammogram: N/A  Exercise: infrequently    The following portions of the patient's history were reviewed and updated as appropriate: allergies, current medications, past family history, past medical history, past social history, past surgical history and problem list.    heterosexual    Family History  No family history on file.    Review of Systems  Review of Systems   Constitutional: Negative for activity change, unexpected weight gain and unexpected weight loss.   Respiratory: Negative for shortness of breath.    Cardiovascular: Negative for chest pain.   Gastrointestinal: Negative for abdominal pain, blood in stool, constipation and diarrhea.   Endocrine: Negative for cold intolerance and heat intolerance.   Genitourinary: Positive for dyspareunia (with deep penetration, but is currently getting a divorce, so may have been relationship tension) and menstrual problem (more PMS symptoms recently). Negative for breast pain, pelvic pain, urinary incontinence (improved with physical therapy), vaginal discharge and vaginal pain.   Musculoskeletal: Negative for arthralgias and back pain.   Neurological: Negative for dizziness and headache.   Psychiatric/Behavioral: Positive for sleep disturbance. Negative for depressed mood. The patient is nervous/anxious (taking ativan and feels like her mood is \"better than you would think it should be (for divorce)\").              Objective        /90  " " Ht 152.4 cm (60\")   Wt 66.2 kg (146 lb)   LMP 06/28/2021 (Exact Date)   BMI 28.51 kg/m²   Physical Exam  Vitals and nursing note reviewed. Exam conducted with a chaperone present.   Constitutional:       General: She is not in acute distress.     Appearance: She is well-developed.   HENT:      Head: Normocephalic and atraumatic.   Cardiovascular:      Rate and Rhythm: Normal rate and regular rhythm.      Heart sounds: No murmur heard.     Pulmonary:      Effort: Pulmonary effort is normal.      Breath sounds: Normal breath sounds.   Chest:      Breasts:         Right: No inverted nipple or mass.         Left: No inverted nipple or mass.   Abdominal:      General: There is no distension.      Palpations: Abdomen is soft.      Tenderness: There is no abdominal tenderness.   Genitourinary:     General: Normal vulva.      Exam position: Lithotomy position.      Labia:         Right: No tenderness or lesion.         Left: No tenderness or lesion.       Vagina: Normal. No vaginal discharge, tenderness or bleeding.      Cervix: No cervical motion tenderness, discharge or friability.      Adnexa:         Right: No tenderness or fullness.          Left: No tenderness or fullness.     Musculoskeletal:         General: Normal range of motion.      Cervical back: Normal range of motion and neck supple.   Skin:     General: Skin is warm and dry.   Neurological:      Mental Status: She is alert and oriented to person, place, and time.   Psychiatric:         Behavior: Behavior normal.         Judgment: Judgment normal.               Assessment  & Plan      Diagnoses and all orders for this visit:    1. Well woman exam with routine gynecological exam (Primary): Exam unremarkable.  Routine screening labs not indicated since patient is only 31 and has no complaints.  Patient declined addition of STD testing to her Pap smear.  Regular self breast exam reported; technique reviewed during exam.  Patient had previously reported " stress urinary incontinence, but reports that that is significantly improved since she attended physical therapy  -     Liquid-based Pap Smear, Screening    2. PMS (premenstrual syndrome).  Patient will return to office in 3 to 4 months if her symptoms have not improved with combination OCPs  -     norethindrone-ethinyl estradiol-ferrous fumarate (LOESTIN 24 FE) 1-20 MG-MCG(24) per tablet; Take 1 tablet by mouth Daily.  Dispense: 28 tablet; Refill: 12    3. Overweight (BMI 25.0-29.9): Discussed exercising more regularly    4. Former smoker  Comments:  quit 2013        This note was electronically signed.    Fanta Javier MD  7/22/2021  09:48 CDT

## 2021-07-23 LAB
GEN CATEG CVX/VAG CYTO-IMP: NORMAL
HPV I/H RISK 4 DNA CVX QL PROBE+SIG AMP: NOT DETECTED
LAB AP CASE REPORT: NORMAL
LAB AP GYN ADDITIONAL INFORMATION: NORMAL
LAB AP GYN OTHER FINDINGS: NORMAL
PATH INTERP SPEC-IMP: NORMAL
STAT OF ADQ CVX/VAG CYTO-IMP: NORMAL

## 2021-09-17 PROCEDURE — U0004 COV-19 TEST NON-CDC HGH THRU: HCPCS | Performed by: NURSE PRACTITIONER

## 2021-11-16 ENCOUNTER — OFFICE VISIT (OUTPATIENT)
Dept: OBSTETRICS AND GYNECOLOGY | Facility: CLINIC | Age: 31
End: 2021-11-16

## 2021-11-16 VITALS
DIASTOLIC BLOOD PRESSURE: 64 MMHG | SYSTOLIC BLOOD PRESSURE: 118 MMHG | WEIGHT: 140 LBS | HEIGHT: 60 IN | BODY MASS INDEX: 27.48 KG/M2

## 2021-11-16 DIAGNOSIS — B96.89 BV (BACTERIAL VAGINOSIS): ICD-10-CM

## 2021-11-16 DIAGNOSIS — N76.0 BV (BACTERIAL VAGINOSIS): ICD-10-CM

## 2021-11-16 DIAGNOSIS — N94.9 VAGINAL BURNING: Primary | ICD-10-CM

## 2021-11-16 LAB — WET PREP GENITAL: ABNORMAL

## 2021-11-16 PROCEDURE — 87491 CHLMYD TRACH DNA AMP PROBE: CPT | Performed by: NURSE PRACTITIONER

## 2021-11-16 PROCEDURE — 99213 OFFICE O/P EST LOW 20 MIN: CPT | Performed by: NURSE PRACTITIONER

## 2021-11-16 PROCEDURE — 87591 N.GONORRHOEAE DNA AMP PROB: CPT | Performed by: NURSE PRACTITIONER

## 2021-11-16 PROCEDURE — 87661 TRICHOMONAS VAGINALIS AMPLIF: CPT | Performed by: NURSE PRACTITIONER

## 2021-11-16 PROCEDURE — 87210 SMEAR WET MOUNT SALINE/INK: CPT | Performed by: NURSE PRACTITIONER

## 2021-11-16 RX ORDER — METRONIDAZOLE 500 MG/1
500 TABLET ORAL 2 TIMES DAILY
Qty: 14 TABLET | Refills: 0 | Status: SHIPPED | OUTPATIENT
Start: 2021-11-16 | End: 2021-11-23

## 2021-11-17 LAB
C TRACH RRNA CVX QL NAA+PROBE: NOT DETECTED
N GONORRHOEA RRNA SPEC QL NAA+PROBE: NOT DETECTED
TRICHOMONAS VAGINALIS PCR: NOT DETECTED

## 2021-11-17 NOTE — PROGRESS NOTES
Kate Veras is a 31 y.o.      Chief Complaint   Patient presents with   • Vaginitis     Patient here for a possible yeast infection. Patient states it is tender, burning, itching and painful when she sits. Patient vocies no complaints or concerns at this time.           HPI -Patient has vaginal irching and burning .  She had bleeding after IC 4 days ago.  She is taking BCP and has no missed any periods.    The following portions of the patient's history were reviewed and updated as appropriate:vital signs, allergies, current medications, past family history, past medical history, past social history, past surgical history and problem list.      Current Outpatient Medications:   •  LORazepam (ATIVAN) 1 MG tablet, Take 1 mg by mouth Every 6 (Six) Hours As Needed., Disp: , Rfl:   •  norethindrone-ethinyl estradiol-ferrous fumarate (LOESTIN 24 FE) 1-20 MG-MCG(24) per tablet, Take 1 tablet by mouth Daily., Disp: 28 tablet, Rfl: 12  •  tretinoin (RETIN-A) 0.025 % cream, Apply 0.025 application topically to the appropriate area as directed Every Night., Disp: , Rfl:   •  brompheniramine-pseudoephedrine-DM 30-2-10 MG/5ML syrup, Take 5 mL by mouth 4 (Four) Times a Day As Needed., Disp: , Rfl:   •  metroNIDAZOLE (Flagyl) 500 MG tablet, Take 1 tablet by mouth 2 (Two) Times a Day for 7 days., Disp: 14 tablet, Rfl: 0    Review of Systems   Constitutional: Negative for chills and fever.   HENT: Negative for congestion, ear pain, sneezing, sore throat and tinnitus.    Eyes: Negative for blurred vision, redness, itching and visual disturbance.   Respiratory: Negative for apnea, choking and shortness of breath.    Cardiovascular: Negative for chest pain and palpitations.   Gastrointestinal: Negative for abdominal distention, nausea and vomiting.   Endocrine: Negative for cold intolerance, polydipsia and polyuria.   Genitourinary: Positive for vaginal discharge. Negative for breast pain, decreased urine volume,  "flank pain, pelvic pain, vaginal bleeding and vaginal pain.        Post coital bleeding x 1   Musculoskeletal: Negative for gait problem and neck pain.   Skin: Negative for color change and pallor.   Neurological: Negative for dizziness, tremors, seizures, speech difficulty, light-headedness and memory problem.   Psychiatric/Behavioral: Negative for behavioral problems, self-injury and suicidal ideas.         Objective     /64   Ht 152.4 cm (60\")   Wt 63.5 kg (140 lb)   LMP 10/16/2021 (Exact Date)   Breastfeeding No   BMI 27.34 kg/m²       Physical Exam  Vitals and nursing note reviewed. Exam conducted with a chaperone present.   HENT:      Head: Normocephalic and atraumatic.      Right Ear: External ear normal.      Left Ear: External ear normal.      Nose: No congestion or rhinorrhea.   Eyes:      General:         Right eye: No discharge.         Left eye: No discharge.   Pulmonary:      Effort: Pulmonary effort is normal.   Abdominal:      Palpations: Abdomen is soft. There is no mass.      Hernia: There is no hernia in the left inguinal area or right inguinal area.   Genitourinary:     Exam position: Lithotomy position.      Labia:         Right: No rash or tenderness.         Left: No rash or tenderness.       Vagina: No signs of injury and foreign body. Vaginal discharge present. No erythema or bleeding.      Cervix: No lesion, erythema, cervical bleeding or eversion.      Uterus: Not deviated.       Adnexa:         Right: No mass.          Left: No mass.        Comments: Cervix is smooth and there are no polyps or irritation, nonfriable  Musculoskeletal:         General: No swelling.   Skin:     General: Skin is warm.   Neurological:      General: No focal deficit present.      Mental Status: She is alert and oriented to person, place, and time.   Psychiatric:         Mood and Affect: Mood normal.         Behavior: Behavior normal.         Thought Content: Thought content normal.          "   Assessment/Plan     Diagnoses and all orders for this visit:    1. Vaginal burning (Primary)  -     Gynecologic Fluid, Supplemental Testing  -     POC Wet Prep  bv    2. BV (bacterial vaginosis)  -     metroNIDAZOLE (Flagyl) 500 MG tablet; Take 1 tablet by mouth 2 (Two) Times a Day for 7 days.  Dispense: 14 tablet; Refill: 0  Avoid etoh  Patient is counseled she will be notified of her vaginal culture results.                Lashae Barnett, APRN  11/16/2021

## 2022-01-04 LAB
LAB AP CASE REPORT: NORMAL
LAB AP GYN ADDITIONAL INFORMATION: NORMAL
Lab: NORMAL

## 2022-07-12 DIAGNOSIS — N94.3 PMS (PREMENSTRUAL SYNDROME): ICD-10-CM

## 2022-07-13 RX ORDER — NORETHINDRONE ACETATE AND ETHINYL ESTRADIOL AND FERROUS FUMARATE 1MG-20(24)
1 KIT ORAL DAILY
Qty: 28 TABLET | Refills: 2 | Status: SHIPPED | OUTPATIENT
Start: 2022-07-13 | End: 2022-08-30

## 2022-07-25 RX ORDER — NORETHINDRONE ACETATE AND ETHINYL ESTRADIOL, ETHINYL ESTRADIOL AND FERROUS FUMARATE 1MG-10(24)
KIT ORAL
Qty: 28 TABLET | Refills: 8 | OUTPATIENT
Start: 2022-07-25

## 2022-08-29 DIAGNOSIS — N94.3 PMS (PREMENSTRUAL SYNDROME): ICD-10-CM

## 2022-08-31 RX ORDER — NORETHINDRONE ACETATE AND ETHINYL ESTRADIOL, AND FERROUS FUMARATE 1MG-20(24)
KIT ORAL
Qty: 28 TABLET | Refills: 0 | Status: SHIPPED | OUTPATIENT
Start: 2022-08-31 | End: 2022-09-20 | Stop reason: ALTCHOICE

## 2022-09-20 ENCOUNTER — OFFICE VISIT (OUTPATIENT)
Dept: OBSTETRICS AND GYNECOLOGY | Facility: CLINIC | Age: 32
End: 2022-09-20

## 2022-09-20 VITALS
SYSTOLIC BLOOD PRESSURE: 122 MMHG | BODY MASS INDEX: 32.98 KG/M2 | WEIGHT: 168 LBS | HEIGHT: 60 IN | DIASTOLIC BLOOD PRESSURE: 82 MMHG

## 2022-09-20 DIAGNOSIS — N94.3 PMS (PREMENSTRUAL SYNDROME): ICD-10-CM

## 2022-09-20 DIAGNOSIS — Z12.4 SCREENING FOR CERVICAL CANCER: ICD-10-CM

## 2022-09-20 DIAGNOSIS — Z01.419 WOMEN'S ANNUAL ROUTINE GYNECOLOGICAL EXAMINATION: Primary | ICD-10-CM

## 2022-09-20 PROCEDURE — G0123 SCREEN CERV/VAG THIN LAYER: HCPCS | Performed by: NURSE PRACTITIONER

## 2022-09-20 PROCEDURE — 87624 HPV HI-RISK TYP POOLED RSLT: CPT | Performed by: NURSE PRACTITIONER

## 2022-09-20 PROCEDURE — 99395 PREV VISIT EST AGE 18-39: CPT | Performed by: NURSE PRACTITIONER

## 2022-09-20 RX ORDER — NORETHINDRONE ACETATE AND ETHINYL ESTRADIOL AND FERROUS FUMARATE 1MG-20(24)
1 KIT ORAL DAILY
Qty: 28 TABLET | Refills: 12 | Status: SHIPPED | OUTPATIENT
Start: 2022-09-20 | End: 2022-09-21 | Stop reason: ALTCHOICE

## 2022-09-20 NOTE — PROGRESS NOTES
Chief Complaint   Patient presents with   • Gynecologic Exam     Pt here for annual. Last PAP was 7/2021 and was normal. Pt would like to discuss options on birth control. Pt currently takes an OCP.       History:  Kate Veras is a 32 y.o. female who presents today for follow-up for evaluation of the above:    HPI      Kate Veras is a 32 y.o. female ,  who comes to the office today for annual GYN examination. Last menstrual period was 08/25/2022  and her last Pap smear was 2021, and was normal. She has no history of cervical dysplasia. She is currently on OCP for contraception and is doing well with them without complaints. Her medical history is reviewed.     No family history of breast cancer.          ROS:  Review of Systems   Constitutional: Negative for fatigue and unexpected weight change.   HENT: Negative.    Eyes: Negative.    Respiratory: Negative.    Cardiovascular: Negative.    Gastrointestinal: Negative for abdominal pain, constipation and diarrhea.   Endocrine: Negative.    Genitourinary: Negative for difficulty urinating, dyspareunia, genital sores, menstrual problem, pelvic pain, vaginal bleeding, vaginal discharge and vaginal pain.   Musculoskeletal: Negative.    Skin: Negative.    Neurological: Negative.    Psychiatric/Behavioral: Negative.        Ms. Veras  reports that she quit smoking about 9 years ago. Her smoking use included cigarettes. She started smoking about 12 years ago. She has a 2.00 pack-year smoking history. She has never used smokeless tobacco. She reports previous alcohol use. She reports that she does not use drugs.      Current Outpatient Medications:   •  LORazepam (ATIVAN) 1 MG tablet, Take 1 mg by mouth Every 6 (Six) Hours As Needed., Disp: , Rfl:   •  tretinoin (RETIN-A) 0.025 % cream, Apply 0.025 application topically to the appropriate area as directed Every Night., Disp: , Rfl:   •  norethindrone-ethinyl estradiol-ferrous fumarate (LOESTIN 24 FE) 1-20  "MG-MCG(24) per tablet, Take 1 tablet by mouth Daily., Disp: 28 tablet, Rfl: 12      OBJECTIVE:  /82   Ht 152.4 cm (60\")   Wt 76.2 kg (168 lb)   LMP 08/25/2022 (Exact Date)   Breastfeeding No   BMI 32.81 kg/m²    Physical Exam  Exam conducted with a chaperone present.   Constitutional:       Appearance: She is well-developed.   HENT:      Head: Normocephalic and atraumatic.   Eyes:      General: Lids are normal.      Conjunctiva/sclera: Conjunctivae normal.      Pupils: Pupils are equal, round, and reactive to light.   Neck:      Thyroid: No thyromegaly.   Cardiovascular:      Rate and Rhythm: Normal rate and regular rhythm.      Heart sounds: Normal heart sounds.   Pulmonary:      Effort: Pulmonary effort is normal.      Breath sounds: Normal breath sounds.   Chest:   Breasts: Breasts are symmetrical.      Right: No inverted nipple, mass, nipple discharge, skin change or tenderness.      Left: No inverted nipple, mass, nipple discharge, skin change or tenderness.       Abdominal:      General: Bowel sounds are normal.      Palpations: Abdomen is soft.   Genitourinary:     Exam position: Supine.      Labia:         Right: No rash, tenderness, lesion or injury.         Left: No rash, tenderness, lesion or injury.       Vagina: No signs of injury and foreign body. No vaginal discharge, erythema, tenderness or bleeding.      Cervix: No cervical motion tenderness, discharge or friability.      Uterus: Not deviated, not enlarged, not fixed and not tender.       Adnexa:         Right: No mass, tenderness or fullness.          Left: No mass, tenderness or fullness.        Rectum: Normal. No tenderness or external hemorrhoid.   Musculoskeletal:         General: Normal range of motion.      Cervical back: Normal range of motion and neck supple.   Skin:     General: Skin is warm and dry.   Neurological:      Mental Status: She is alert and oriented to person, place, and time.         Assessment/Plan    Diagnoses and " all orders for this visit:    1. Women's annual routine gynecological examination (Primary)  Immunizations:      - Tetanus: Unknown or >10 years ago. Recommend to have at pharmacy or on injury.      - Influenza: recommended annually      - Pneumovax:once after age 65      - Prevnar: Once after age 65      - Zostavax: Once after age 60  Colon Cancer Screening: Due at 45  Mammogram: Due at 40.  PAP: done today. Discussed guidelines and patient would like yearly pap  DEXA: DEXA scan at 65  COVID vaccine information is available at vaccine.[place].gov     2. PMS (premenstrual syndrome)  -     norethindrone-ethinyl estradiol-ferrous fumarate (LOESTIN 24 FE) 1-20 MG-MCG(24) per tablet; Take 1 tablet by mouth Daily.  Dispense: 28 tablet; Refill: 12    3. Screening for cervical cancer  -     Liquid-based Pap Smear, Screening; Future  -     Liquid-based Pap Smear, Screening     patient will let us know about Mirena.     We will notify her when the Pap smear results are available.  She will return in one year. In the meantime if she develops questions or problems, she will notify the office.     An After Visit Summary was printed and given to the patient at discharge.  Return in about 1 year (around 9/20/2023) for Annual physical. Sooner if problems arise.          Radha FRY. 9/20/2022   Electronically Signed

## 2022-09-21 DIAGNOSIS — N92.1 BREAKTHROUGH BLEEDING ON OCPS: Primary | ICD-10-CM

## 2022-09-22 LAB
GEN CATEG CVX/VAG CYTO-IMP: NORMAL
HPV I/H RISK 4 DNA CVX QL PROBE+SIG AMP: NOT DETECTED
LAB AP CASE REPORT: NORMAL
LAB AP GYN ADDITIONAL INFORMATION: NORMAL
LAB AP GYN OTHER FINDINGS: NORMAL
Lab: NORMAL
PATH INTERP SPEC-IMP: NORMAL
STAT OF ADQ CVX/VAG CYTO-IMP: NORMAL

## 2023-08-25 DIAGNOSIS — N92.1 BREAKTHROUGH BLEEDING ON OCPS: ICD-10-CM

## 2023-09-26 ENCOUNTER — OFFICE VISIT (OUTPATIENT)
Dept: OBSTETRICS AND GYNECOLOGY | Facility: CLINIC | Age: 33
End: 2023-09-26
Payer: COMMERCIAL

## 2023-09-26 VITALS
SYSTOLIC BLOOD PRESSURE: 122 MMHG | HEIGHT: 60 IN | WEIGHT: 142 LBS | BODY MASS INDEX: 27.88 KG/M2 | DIASTOLIC BLOOD PRESSURE: 78 MMHG

## 2023-09-26 DIAGNOSIS — Z87.891 FORMER SMOKER: ICD-10-CM

## 2023-09-26 DIAGNOSIS — N92.1 BREAKTHROUGH BLEEDING ON OCPS: ICD-10-CM

## 2023-09-26 DIAGNOSIS — Z01.419 WOMEN'S ANNUAL ROUTINE GYNECOLOGICAL EXAMINATION: Primary | ICD-10-CM

## 2023-09-26 PROCEDURE — 99395 PREV VISIT EST AGE 18-39: CPT | Performed by: OBSTETRICS & GYNECOLOGY

## 2023-09-26 RX ORDER — NITROFURANTOIN 25; 75 MG/1; MG/1
1 CAPSULE ORAL EVERY 12 HOURS SCHEDULED
COMMUNITY
Start: 2023-09-20

## 2023-09-26 RX ORDER — SEMAGLUTIDE 1.7 MG/.75ML
INJECTION, SOLUTION SUBCUTANEOUS
COMMUNITY
Start: 2023-08-01

## 2023-09-26 NOTE — PROGRESS NOTES
"Subjective      Kate Veras is a 33 y.o. female who presents for her routine annual exam. Overall, patient reports to be feeling \"great\".  Periods are irregular - she didn't have menses at all after she first started taking Lo Loestrin, but then settled into a pattern of having two day periods every month.  For the past two months she has completely skipped her period, but also reports that she has recently lost 35 pounds in just 6 months.  Patient did recently take a UPT, and it was negative.     Current contraception: OCP (estrogen/progesterone)  Regular self breast exam: yes  History of abnormal Pap smear: yes - in 2010, all normal since that time  History of abnormal mammogram:  N/A  Exercise: infrequently    The following portions of the patient's history were reviewed and updated as appropriate: allergies, current medications, past family history, past medical history, past social history, past surgical history, and problem list.    heterosexual    Family History  Family History   Problem Relation Age of Onset    Osteoporosis Mother     Hypertension Mother     Diabetes Father     Hypertension Father     Hypertension Maternal Grandmother     Diabetes Paternal Grandfather     Diabetes Paternal Grandmother        Review of Systems  Review of Systems   Constitutional:  Negative for activity change and unexpected weight loss.   HENT:  Negative for congestion.    Respiratory:  Negative for shortness of breath.    Cardiovascular:  Negative for chest pain.   Gastrointestinal:  Positive for abdominal pain (only if she eats too much on [place]). Negative for blood in stool, constipation and diarrhea.   Endocrine: Negative for cold intolerance and heat intolerance.   Genitourinary:  Positive for menstrual problem (irregular, but likely due to recent weight loss which was fairly rapid). Negative for breast lump, breast pain, decreased libido, difficulty urinating, dyspareunia, pelvic pain, urinary incontinence, " "vaginal bleeding, vaginal discharge and vaginal pain.   Musculoskeletal:  Negative for arthralgias, back pain, neck pain and neck stiffness.   Skin:  Negative for rash.   Neurological:  Negative for dizziness and headache.   Psychiatric/Behavioral:  Negative for sleep disturbance and depressed mood. The patient is not nervous/anxious (just rarely takes avitan for feeling \"overwhelmed\").            Objective        /78   Ht 152.4 cm (60\")   Wt 64.4 kg (142 lb)   LMP 07/29/2023 (Approximate)   BMI 27.73 kg/m²   Physical Exam  Vitals and nursing note reviewed. Exam conducted with a chaperone present.   Constitutional:       General: She is not in acute distress.     Appearance: She is well-developed.   HENT:      Head: Normocephalic and atraumatic.   Cardiovascular:      Rate and Rhythm: Normal rate and regular rhythm.      Heart sounds: No murmur heard.  Pulmonary:      Effort: Pulmonary effort is normal.      Breath sounds: Normal breath sounds.   Chest:   Breasts:     Right: No inverted nipple or mass.      Left: No inverted nipple or mass.   Abdominal:      General: There is no distension.      Palpations: Abdomen is soft.      Tenderness: There is no abdominal tenderness.   Genitourinary:     General: Normal vulva.      Exam position: Lithotomy position.      Labia:         Right: No tenderness or lesion.         Left: No tenderness or lesion.       Vagina: Normal. No vaginal discharge, tenderness or bleeding.      Cervix: No cervical motion tenderness, discharge or friability.      Adnexa:         Right: No tenderness or fullness.          Left: No tenderness or fullness.        Rectum: Normal.   Musculoskeletal:         General: Normal range of motion.      Cervical back: Normal range of motion and neck supple.   Skin:     General: Skin is warm and dry.   Neurological:      Mental Status: She is alert and oriented to person, place, and time.   Psychiatric:         Mood and Affect: Mood normal.         " "Behavior: Behavior normal.         Thought Content: Thought content normal.         Judgment: Judgment normal.               Assessment  & Plan    Diagnoses and all orders for this visit:    1. Women's annual routine gynecological examination (Primary): Exam remarkable for significant weight loss -the patient has lost about 35 pounds in the past 6 months (additionally).  Secondary to weight loss, the patient reports she feels \"great\".  Routine screening labs per PCP, who is helping manage weight loss medications.  Regular self breast exam encouraged; mammograms to begin at age 40 since the patient has no family history.  Increased exercise encouraged.  Patient is having some breakthrough bleeding/irregular bleeding on her OCPs, but this is only been recently during a phase of rapid weight loss -in general, the patient likes the pill but she is on and wants to give it a little more time to see if her cycles regulate back to what they were previously.  Refills of pills sent.  The patient had a normal Pap last year, so a new Pap is not indicated today.    2. Breakthrough bleeding on OCPs  -     Norethin-Eth Estrad-Fe Biphas (LO LOESTRIN FE) 1 MG-10 MCG / 10 MCG tablet; Take 1 tablet by mouth Daily.  Dispense: 28 tablet; Refill: 12    3. Former smoker         This note was electronically signed.    Fanta Javier MD  9/26/2023  15:06 CDT    "

## 2024-10-01 ENCOUNTER — OFFICE VISIT (OUTPATIENT)
Dept: OBSTETRICS AND GYNECOLOGY | Age: 34
End: 2024-10-01
Payer: COMMERCIAL

## 2024-10-01 VITALS
WEIGHT: 149 LBS | DIASTOLIC BLOOD PRESSURE: 70 MMHG | HEIGHT: 60 IN | BODY MASS INDEX: 29.25 KG/M2 | SYSTOLIC BLOOD PRESSURE: 116 MMHG

## 2024-10-01 DIAGNOSIS — Z87.891 FORMER SMOKER: ICD-10-CM

## 2024-10-01 DIAGNOSIS — F41.9 ANXIETY AND DEPRESSION: ICD-10-CM

## 2024-10-01 DIAGNOSIS — Z01.419 WOMEN'S ANNUAL ROUTINE GYNECOLOGICAL EXAMINATION: Primary | ICD-10-CM

## 2024-10-01 DIAGNOSIS — Z30.2 ENCOUNTER FOR STERILIZATION: ICD-10-CM

## 2024-10-01 DIAGNOSIS — F32.A ANXIETY AND DEPRESSION: ICD-10-CM

## 2024-10-01 RX ORDER — SCOLOPAMINE TRANSDERMAL SYSTEM 1 MG/1
1 PATCH, EXTENDED RELEASE TRANSDERMAL CONTINUOUS
OUTPATIENT
Start: 2024-10-01 | End: 2024-10-04

## 2024-10-01 RX ORDER — SODIUM CHLORIDE 0.9 % (FLUSH) 0.9 %
3 SYRINGE (ML) INJECTION EVERY 12 HOURS SCHEDULED
OUTPATIENT
Start: 2024-10-01

## 2024-10-01 RX ORDER — GABAPENTIN 100 MG/1
600 CAPSULE ORAL ONCE
OUTPATIENT
Start: 2024-10-01 | End: 2024-10-01

## 2024-10-01 RX ORDER — SODIUM CHLORIDE 0.9 % (FLUSH) 0.9 %
10 SYRINGE (ML) INJECTION AS NEEDED
OUTPATIENT
Start: 2024-10-01

## 2024-10-01 RX ORDER — SERTRALINE HYDROCHLORIDE 25 MG/1
25 TABLET, FILM COATED ORAL DAILY
Qty: 30 TABLET | Refills: 11 | Status: SHIPPED | OUTPATIENT
Start: 2024-10-01

## 2024-10-01 RX ORDER — SODIUM CHLORIDE 9 MG/ML
40 INJECTION, SOLUTION INTRAVENOUS AS NEEDED
OUTPATIENT
Start: 2024-10-01

## 2024-10-01 RX ORDER — ACETAMINOPHEN 500 MG
1000 TABLET ORAL ONCE
OUTPATIENT
Start: 2024-10-01 | End: 2024-10-01

## 2024-10-01 RX ORDER — SEMAGLUTIDE 2.4 MG/.75ML
INJECTION, SOLUTION SUBCUTANEOUS
COMMUNITY
Start: 2024-09-28

## 2024-10-01 NOTE — PROGRESS NOTES
"Subjective      Kate Veras is a 34 y.o. female who presents for her routine annual exam. Overall, patient reports to be feeling \"great\".  Periods are irregular - about every other months she will skip her menses completely; and on the months that she does bleed, it is light. Patient pleased with current contraception since Lo Loestrin has decreased her bleeding so much compared to what it used to be.      Current contraception: OCP (estrogen/progesterone)  Regular self breast exam: yes  History of abnormal Pap smear: yes - in , all normal since that time .  Most recent normal was in   History of abnormal mammogram:  N/A  Exercise: infrequently, but is active with two young children    The following portions of the patient's history were reviewed and updated as appropriate: allergies, current medications, past family history, past medical history, past social history, past surgical history, and problem list.    Patient tired of taking OCP's and and wants to have a salpingectomy.  Patient is pleased with changes that pills have made in her bleeding profile, but still wants to have a salpingectomy.  We have discussed that her bleeding will go back to whatever it was prior to starting the Lo Loestrin.      heterosexual    Family History  Family History   Problem Relation Age of Onset    Osteoporosis Mother     Hypertension Mother     Diabetes Father     Hypertension Father     Hypertension Maternal Grandmother     Diabetes Paternal Grandfather     Diabetes Paternal Grandmother      Past Medical History:   Diagnosis Date    Abnormal Pap smear of cervix ?    Ectopic pregnancy 2017    Gestational diabetes     Gestational hypertension 2015    Heart murmur     was told wouldn't cause problems    Pre-eclampsia     Recurrent pregnancy loss, antepartum condition or complication 2017    Methotrexate given      Past Surgical History:   Procedure Laterality Date     SECTION      x1    LASIK      " "TONSILLECTOMY      WISDOM TOOTH EXTRACTION  ?      Social History     Socioeconomic History    Marital status:    Tobacco Use    Smoking status: Former     Current packs/day: 0.00     Average packs/day: 0.5 packs/day for 4.0 years (2.0 ttl pk-yrs)     Types: Cigarettes     Start date: 2009     Quit date: 2013     Years since quittin.7    Smokeless tobacco: Never    Tobacco comments:        Vaping Use    Vaping status: Never Used   Substance and Sexual Activity    Alcohol use: Yes     Comment: occ    Drug use: No    Sexual activity: Yes     Partners: Male     Birth control/protection: Pill      Review of Systems  Review of Systems   Constitutional:  Negative for activity change and unexpected weight loss.   HENT:  Negative for congestion.    Respiratory:  Negative for shortness of breath.    Cardiovascular:  Negative for chest pain.   Gastrointestinal:  Negative for abdominal pain, blood in stool, constipation and diarrhea.   Endocrine: Negative for cold intolerance and heat intolerance.   Genitourinary:  Negative for breast lump, breast pain, decreased libido, difficulty urinating, dyspareunia, menstrual problem (irregular, sometimes skipping months and other months very light.  This is an improvement since being on lo Loestrin), pelvic pain, urinary incontinence, vaginal bleeding, vaginal discharge and vaginal pain.   Musculoskeletal:  Positive for back pain (attributes to age). Negative for arthralgias.   Skin:  Negative for rash.   Neurological:  Negative for dizziness and headache.   Psychiatric/Behavioral:  Positive for agitation (gets easily irritated for past few months). Negative for sleep disturbance and depressed mood. The patient is nervous/anxious (just rarely takes avitan).              Objective        /70   Ht 152.4 cm (60\")   Wt 67.6 kg (149 lb)   BMI 29.10 kg/m²   Physical Exam  Vitals and nursing note reviewed. Exam conducted with a chaperone present. " "  Constitutional:       General: She is not in acute distress.     Appearance: Normal appearance. She is well-developed.   HENT:      Head: Normocephalic and atraumatic.   Cardiovascular:      Rate and Rhythm: Normal rate and regular rhythm.      Heart sounds: No murmur heard.  Pulmonary:      Effort: Pulmonary effort is normal.      Breath sounds: Normal breath sounds.   Chest:   Breasts:     Right: No inverted nipple or mass.      Left: No inverted nipple or mass.   Abdominal:      General: There is no distension.      Palpations: Abdomen is soft.      Tenderness: There is no abdominal tenderness.   Genitourinary:     General: Normal vulva.      Exam position: Lithotomy position.      Labia:         Right: No tenderness or lesion.         Left: No tenderness or lesion.       Vagina: Normal. No vaginal discharge, tenderness or bleeding.      Cervix: No cervical motion tenderness, discharge or friability.      Adnexa:         Right: No tenderness or fullness.          Left: No tenderness or fullness.        Rectum: Normal.   Musculoskeletal:         General: Normal range of motion.      Cervical back: Normal range of motion and neck supple.   Skin:     General: Skin is warm and dry.   Neurological:      Mental Status: She is alert and oriented to person, place, and time.   Psychiatric:         Mood and Affect: Mood normal.         Behavior: Behavior normal.         Thought Content: Thought content normal.         Judgment: Judgment normal.             Assessment  & Plan    Diagnoses and all orders for this visit:    1. Women's annual routine gynecological examination (Primary): Exam un remarkable.  Since weight loss on Weygovy, the patient reports she feels \"great\".  Routine screening labs per PCP, who is helping manage weight loss medications.  Regular self breast exam encouraged; mammograms to begin at age 39 since the patient has no family history.  Patient very pleased with improvement in her bleeding profile " since starting low Loestrin last year at her annual exam.  Patient declines STD testing.  Pap not indicated until next year since she had a normal one in 2022    2. Anxiety and depression: Patient feels like she has been extra irritable over small things in the last several months.  She would like to try daily medication to help with irritability that she thinks is due to anxiety  -     sertraline (Zoloft) 25 MG tablet; Take 1 tablet by mouth Daily.  Dispense: 30 tablet; Refill: 11    3. Encounter for sterilization: Despite the fact that her bleeding profile is better on combination OCPs, the patient says she does not like having to take a daily medication and desires permanent sterilization.  We had talked about it last year, but the patient had elected for OCPs at the time.  The patient understands that her sterilization will include complete removal of the fallopian tubes, and that this is permanent/irreversible.  Risks of laparoscopic surgery were reviewed to include, but are not limited to, the possibility of bowel perforation requiring possible bowel resection and/or colostomy, possible bladder injury or possible and possible vascular injury which could require the need for a blood transfusion.  Possible need for conversion to a laparotomy was also discussed.  The patient's questions were answered to her satisfaction.  Post-op restrictions and typical post-op course were also reviewed.   -     Case Request; Standing  -     CBC and Differential; Future  -     Type and screen; Future  -     ECG 12 Lead; Future  -     sodium chloride 0.9 % flush 3 mL  -     sodium chloride 0.9 % flush 10 mL  -     sodium chloride 0.9 % infusion 40 mL  -     ceFAZolin (ANCEF) 2,000 mg in sodium chloride 0.9 % 100 mL IVPB  -     acetaminophen (TYLENOL) tablet 1,000 mg  -     gabapentin (NEURONTIN) capsule 600 mg  -     scopolamine patch 1 mg/72 hr  -     Case Request    4. Former smoker    Other orders  -     Code Status and  Medical Interventions: CPR (Attempt to Resuscitate); Full Support; Standing  -     Follow Anesthesia Guidelines / Protocol; Future  -     Follow Anesthesia Guidelines / Protocol; Standing  -     Chlorhexidine Skin Prep; Future  -     Verify / Perform Chlorhexidine Skin Prep; Standing  -     Insert Peripheral IV; Standing  -     Saline Lock & Maintain IV Access; Standing  -     Place Sequential Compression Device; Standing  -     Maintain Sequential Compression Device; Standing  -     Obtain informed consent; Standing  -     Verify NPO Status; Standing      This note was electronically signed.    Fanta Javier MD  10/1/2024  15:38 CDT

## 2024-10-17 DIAGNOSIS — N92.1 BREAKTHROUGH BLEEDING ON OCPS: ICD-10-CM

## 2024-10-31 ENCOUNTER — PRE-ADMISSION TESTING (OUTPATIENT)
Dept: PREADMISSION TESTING | Facility: HOSPITAL | Age: 34
End: 2024-10-31
Payer: COMMERCIAL

## 2024-10-31 VITALS
WEIGHT: 149.03 LBS | HEIGHT: 60 IN | RESPIRATION RATE: 16 BRPM | DIASTOLIC BLOOD PRESSURE: 86 MMHG | SYSTOLIC BLOOD PRESSURE: 144 MMHG | HEART RATE: 74 BPM | OXYGEN SATURATION: 99 % | BODY MASS INDEX: 29.26 KG/M2

## 2024-10-31 DIAGNOSIS — Z30.2 ENCOUNTER FOR STERILIZATION: ICD-10-CM

## 2024-10-31 LAB
ANION GAP SERPL CALCULATED.3IONS-SCNC: 10 MMOL/L (ref 5–15)
BUN SERPL-MCNC: 10 MG/DL (ref 6–20)
BUN/CREAT SERPL: 22.7 (ref 7–25)
CALCIUM SPEC-SCNC: 9.3 MG/DL (ref 8.6–10.5)
CHLORIDE SERPL-SCNC: 103 MMOL/L (ref 98–107)
CO2 SERPL-SCNC: 25 MMOL/L (ref 22–29)
CREAT SERPL-MCNC: 0.44 MG/DL (ref 0.57–1)
EGFRCR SERPLBLD CKD-EPI 2021: 129.5 ML/MIN/1.73
GLUCOSE SERPL-MCNC: 90 MG/DL (ref 65–99)
POTASSIUM SERPL-SCNC: 4.2 MMOL/L (ref 3.5–5.2)
SODIUM SERPL-SCNC: 138 MMOL/L (ref 136–145)

## 2024-10-31 PROCEDURE — 93005 ELECTROCARDIOGRAM TRACING: CPT

## 2024-10-31 PROCEDURE — 36415 COLL VENOUS BLD VENIPUNCTURE: CPT

## 2024-10-31 PROCEDURE — 80048 BASIC METABOLIC PNL TOTAL CA: CPT

## 2024-10-31 PROCEDURE — 85025 COMPLETE CBC W/AUTO DIFF WBC: CPT | Performed by: OBSTETRICS & GYNECOLOGY

## 2024-10-31 NOTE — DISCHARGE INSTRUCTIONS
Preparing for Surgery  Follow these instructions before the procedure:  Several days or weeks before your procedure  Medication(s) you need to stop   ___wegovy____1 week prior to surgery:       Ask your health care provider about:  Changing or stopping your regular medicines. This is especially important if you are taking diabetes medicines or blood thinners.  Taking medicines such as aspirin and ibuprofen. These medicines can thin your blood. Do not take these medicines unless your health care provider tells you to take them.  Taking over-the-counter medicines, vitamins, herbs, and supplements.    Contact your surgeon if you:  Develop a fever of more than 100.4°F (38°C) or other feelings of illness during the 48 hours before your surgery.  Have symptoms that get worse.  Have questions or concerns about your surgery.  If you are going home the same day of your surgery you will need to arrange for a responsible adult, age 18 years old or older, to drive you home from the hospital and stay with you for 24 hours. Verification of the  will be made prior to any procedure requiring sedation. You may not go home in a taxi or any form of public transportation by yourself.     Day before your procedure  24 hours before your procedure DO NOT drink alcoholic beverages or smoke.  2You may be asked to shower with a germ-killing soap.  Day of your procedure   You may take the following medication(s) the morning of surgery with a sip of water: ativan      8 hours before your scheduled arrival time, STOP all food, any dairy products, and full liquids. This includes hard candy, chewing gum or mints. This is extremely important to prevent serious complications.     Up to 2 hours before your scheduled arrival time, you may have clear liquids no cream, powder, or pulp of any kind. Safe options are water, black coffee, plain tea, soda, Gatorade/Powerade, clear broth, apple juice.    2 hours before your scheduled arrival time,  STOP drinking clear liquids.    You may need to take another shower with a germ-killing soap before you leave home in the morning. Do not use perfumes, colognes, or body lotions.  Wear comfortable loose-fitting clothing.  Remove all jewelry including body piercing and rings, dark colored nail polish, and make up prior to arrival at the hospital. Leave all valuables at home.   Bring your hearing aids if you rely on them.  Do not wear contact lenses. If you wear eyeglasses remember to bring a case to store them in while you are in surgery.  Do not use denture adhesives since you will be asked to remove them during your surgery.    You do not need to bring your home medications into the hospital.   Bring your sleep apnea device with you on the day of your surgery (if this applies to you).  If you wear portable oxygen, bring it with you.   If you are staying overnight, you may bring a bag of items you may need such as slippers, robe and a change of clothes for your discharge. You may want to leave these items in the car until you are ready for them since your family will take your belongings when you leave the pre-operative area.  Arrive at the hospital as scheduled by the office. You will be asked to arrive 2 hours prior to your surgery time in order to prepare for your procedure.  When you arrive at the hospital  Go to the registration desk located at the main entrance of the hospital.  After registration is completed, you will be given a beeper and a sticker sheet. Take the stickers to Outpatient Surgery and place in the tray at the end of the desk to notify the staff that you have arrived and registered.   Return to the lobby to wait. You are not always called back according to the time of arrival but rather the time your doctor will be ready.  When your beeper lights up and vibrates proceed through the double doors, under the stairs, and a member of the Outpatient Surgery staff will escort you to your preoperative  room.   How to Use Chlorhexidine Before Surgery  Chlorhexidine gluconate (CHG) is a germ-killing (antiseptic) solution that is used to clean the skin. It can get rid of the bacteria that normally live on the skin and can keep them away for about 24 hours. To clean your skin with CHG, you may be given:  A CHG solution to use in the shower or as part of a sponge bath.  A prepackaged cloth that contains CHG.  Cleaning your skin with CHG may help lower the risk for infection:  While you are staying in the intensive care unit of the hospital.  If you have a vascular access, such as a central line, to provide short-term or long-term access to your veins.  If you have a catheter to drain urine from your bladder.  If you are on a ventilator. A ventilator is a machine that helps you breathe by moving air in and out of your lungs.  After surgery.  What are the risks?  Risks of using CHG include:  A skin reaction.  Hearing loss, if CHG gets in your ears and you have a perforated eardrum.  Eye injury, if CHG gets in your eyes and is not rinsed out.  The CHG product catching fire.  Make sure that you avoid smoking and flames after applying CHG to your skin.  Do not use CHG:  If you have a chlorhexidine allergy or have previously reacted to chlorhexidine.  On babies younger than 2 months of age.  How to use CHG solution  Use CHG only as told by your health care provider, and follow the instructions on the label.  Use the full amount of CHG as directed. Usually, this is one bottle.  During a shower    Follow these steps when using CHG solution during a shower (unless your health care provider gives you different instructions):  Start the shower.  Use your normal soap and shampoo to wash your face and hair.  Turn off the shower or move out of the shower stream.  Pour the CHG onto a clean washcloth. Do not use any type of brush or rough-edged sponge.  Starting at your neck, lather your body down to your toes. Make sure you follow  these instructions:  If you will be having surgery, pay special attention to the part of your body where you will be having surgery. Scrub this area for at least 1 minute.  Do not use CHG on your head or face. If the solution gets into your ears or eyes, rinse them well with water.  Avoid your genital area.  Avoid any areas of skin that have broken skin, cuts, or scrapes.  Scrub your back and under your arms. Make sure to wash skin folds.  Let the lather sit on your skin for 1-2 minutes or as long as told by your health care provider.  Thoroughly rinse your entire body in the shower. Make sure that all body creases and crevices are rinsed well.  Dry off with a clean towel. Do not put any substances on your body afterward--such as powder, lotion, or perfume--unless you are told to do so by your health care provider. Only use lotions that are recommended by the .  Put on clean clothes or pajamas.  If it is the night before your surgery, sleep in clean sheets.     During a sponge bath  Follow these steps when using CHG solution during a sponge bath (unless your health care provider gives you different instructions):  Use your normal soap and shampoo to wash your face and hair.  Pour the CHG onto a clean washcloth.  Starting at your neck, lather your body down to your toes. Make sure you follow these instructions:  If you will be having surgery, pay special attention to the part of your body where you will be having surgery. Scrub this area for at least 1 minute.  Do not use CHG on your head or face. If the solution gets into your ears or eyes, rinse them well with water.  Avoid your genital area.  Avoid any areas of skin that have broken skin, cuts, or scrapes.  Scrub your back and under your arms. Make sure to wash skin folds.  Let the lather sit on your skin for 1-2 minutes or as long as told by your health care provider.  Using a different clean, wet washcloth, thoroughly rinse your entire body. Make  sure that all body creases and crevices are rinsed well.  Dry off with a clean towel. Do not put any substances on your body afterward--such as powder, lotion, or perfume--unless you are told to do so by your health care provider. Only use lotions that are recommended by the .  Put on clean clothes or pajamas.  If it is the night before your surgery, sleep in clean sheets.  How to use CHG prepackaged cloths  Only use CHG cloths as told by your health care provider, and follow the instructions on the label.  Use the CHG cloth on clean, dry skin.  Do not use the CHG cloth on your head or face unless your health care provider tells you to.  When washing with the CHG cloth:  Avoid your genital area.  Avoid any areas of skin that have broken skin, cuts, or scrapes.  Before surgery    Follow these steps when using a CHG cloth to clean before surgery (unless your health care provider gives you different instructions):  Using the CHG cloth, vigorously scrub the part of your body where you will be having surgery. Scrub using a back-and-forth motion for 3 minutes. The area on your body should be completely wet with CHG when you are done scrubbing.  Do not rinse. Discard the cloth and let the area air-dry. Do not put any substances on the area afterward, such as powder, lotion, or perfume.  Put on clean clothes or pajamas.  If it is the night before your surgery, sleep in clean sheets.     For general bathing  Follow these steps when using CHG cloths for general bathing (unless your health care provider gives you different instructions).  Use a separate CHG cloth for each area of your body. Make sure you wash between any folds of skin and between your fingers and toes. Wash your body in the following order, switching to a new cloth after each step:  The front of your neck, shoulders, and chest.  Both of your arms, under your arms, and your hands.  Your stomach and groin area, avoiding the genitals.  Your right leg  and foot.  Your left leg and foot.  The back of your neck, your back, and your buttocks.  Do not rinse. Discard the cloth and let the area air-dry. Do not put any substances on your body afterward--such as powder, lotion, or perfume--unless you are told to do so by your health care provider. Only use lotions that are recommended by the .  Put on clean clothes or pajamas.  Contact a health care provider if:  Your skin gets irritated after scrubbing.  You have questions about using your solution or cloth.  You swallow any chlorhexidine. Call your local poison control center (1-212.647.5518 in the [place]  Get help right away if:  Your eyes itch badly, or they become very red or swollen.  Your skin itches badly and is red or swollen.  Your hearing changes.  You have trouble seeing.  You have swelling or tingling in your mouth or throat.  You have trouble breathing.  These symptoms may represent a serious problem that is an emergency. Do not wait to see if the symptoms will go away. Get medical help right away. Call your local emergency services (704 in the [place] Do not drive yourself to the hospital.  Summary  Chlorhexidine gluconate (CHG) is a germ-killing (antiseptic) solution that is used to clean the skin. Cleaning your skin with CHG may help to lower your risk for infection.  You may be given CHG to use for bathing. It may be in a bottle or in a prepackaged cloth to use on your skin. Carefully follow your health care provider's instructions and the instructions on the product label.  Do not use CHG if you have a chlorhexidine allergy.  Contact your health care provider if your skin gets irritated after scrubbing.  This information is not intended to replace advice given to you by your health care provider. Make sure you discuss any questions you have with your health care provider.  Document Revised: 04/17/2023 Document Reviewed: 02/28/2022  Elsevier Patient Education © 2023 [employer]

## 2024-11-04 LAB
QT INTERVAL: 374 MS
QTC INTERVAL: 403 MS

## 2024-11-11 ENCOUNTER — HOSPITAL ENCOUNTER (OUTPATIENT)
Facility: HOSPITAL | Age: 34
Setting detail: HOSPITAL OUTPATIENT SURGERY
Discharge: HOME | End: 2024-11-11
Attending: OBSTETRICS & GYNECOLOGY | Admitting: OBSTETRICS & GYNECOLOGY
Payer: COMMERCIAL

## 2024-11-11 ENCOUNTER — ANESTHESIA (OUTPATIENT)
Dept: PERIOP | Facility: HOSPITAL | Age: 34
End: 2024-11-11
Payer: COMMERCIAL

## 2024-11-11 ENCOUNTER — ANESTHESIA EVENT (OUTPATIENT)
Dept: PERIOP | Facility: HOSPITAL | Age: 34
End: 2024-11-11
Payer: COMMERCIAL

## 2024-11-11 VITALS
TEMPERATURE: 99 F | DIASTOLIC BLOOD PRESSURE: 73 MMHG | HEART RATE: 81 BPM | OXYGEN SATURATION: 97 % | SYSTOLIC BLOOD PRESSURE: 110 MMHG | RESPIRATION RATE: 18 BRPM

## 2024-11-11 DIAGNOSIS — Z09 S/P GYNECOLOGICAL SURGERY, FOLLOW-UP EXAM: Primary | ICD-10-CM

## 2024-11-11 DIAGNOSIS — Z30.2 ENCOUNTER FOR STERILIZATION: ICD-10-CM

## 2024-11-11 PROBLEM — E86.0 DEHYDRATION: Status: RESOLVED | Noted: 2018-05-18 | Resolved: 2024-11-11

## 2024-11-11 PROBLEM — Z34.90 PREGNANCY: Status: RESOLVED | Noted: 2018-05-16 | Resolved: 2024-11-11

## 2024-11-11 LAB
ABO GROUP BLD: NORMAL
B-HCG UR QL: NEGATIVE
BLD GP AB SCN SERPL QL: NEGATIVE
RH BLD: POSITIVE
T&S EXPIRATION DATE: NORMAL

## 2024-11-11 PROCEDURE — 25010000002 MIDAZOLAM PER 1MG: Performed by: ANESTHESIOLOGY

## 2024-11-11 PROCEDURE — 25010000002 CEFAZOLIN PER 500 MG: Performed by: OBSTETRICS & GYNECOLOGY

## 2024-11-11 PROCEDURE — 86850 RBC ANTIBODY SCREEN: CPT | Performed by: OBSTETRICS & GYNECOLOGY

## 2024-11-11 PROCEDURE — 58661 LAPAROSCOPY REMOVE ADNEXA: CPT | Performed by: OBSTETRICS & GYNECOLOGY

## 2024-11-11 PROCEDURE — 86901 BLOOD TYPING SEROLOGIC RH(D): CPT | Performed by: OBSTETRICS & GYNECOLOGY

## 2024-11-11 PROCEDURE — 25010000002 DEXAMETHASONE PER 1 MG

## 2024-11-11 PROCEDURE — 25010000002 LIDOCAINE PF 2% 2 % SOLUTION

## 2024-11-11 PROCEDURE — 81025 URINE PREGNANCY TEST: CPT | Performed by: OBSTETRICS & GYNECOLOGY

## 2024-11-11 PROCEDURE — 86900 BLOOD TYPING SEROLOGIC ABO: CPT | Performed by: OBSTETRICS & GYNECOLOGY

## 2024-11-11 PROCEDURE — 25810000003 LACTATED RINGERS PER 1000 ML: Performed by: OBSTETRICS & GYNECOLOGY

## 2024-11-11 PROCEDURE — S0260 H&P FOR SURGERY: HCPCS | Performed by: OBSTETRICS & GYNECOLOGY

## 2024-11-11 PROCEDURE — 25010000002 PROPOFOL 10 MG/ML EMULSION

## 2024-11-11 PROCEDURE — 25010000002 FENTANYL CITRATE (PF) 100 MCG/2ML SOLUTION

## 2024-11-11 PROCEDURE — 88302 TISSUE EXAM BY PATHOLOGIST: CPT | Performed by: OBSTETRICS & GYNECOLOGY

## 2024-11-11 PROCEDURE — 25010000002 SUGAMMADEX 200 MG/2ML SOLUTION

## 2024-11-11 PROCEDURE — 25010000002 ONDANSETRON PER 1 MG

## 2024-11-11 RX ORDER — OXYCODONE AND ACETAMINOPHEN 5; 325 MG/1; MG/1
1 TABLET ORAL EVERY 4 HOURS PRN
Qty: 8 TABLET | Refills: 0 | Status: SHIPPED | OUTPATIENT
Start: 2024-11-11

## 2024-11-11 RX ORDER — ACETAMINOPHEN 500 MG
1000 TABLET ORAL ONCE
Status: DISCONTINUED | OUTPATIENT
Start: 2024-11-11 | End: 2024-11-11 | Stop reason: HOSPADM

## 2024-11-11 RX ORDER — SODIUM CHLORIDE 0.9 % (FLUSH) 0.9 %
3-10 SYRINGE (ML) INJECTION AS NEEDED
Status: DISCONTINUED | OUTPATIENT
Start: 2024-11-11 | End: 2024-11-11 | Stop reason: HOSPADM

## 2024-11-11 RX ORDER — ROCURONIUM BROMIDE 10 MG/ML
INJECTION, SOLUTION INTRAVENOUS AS NEEDED
Status: DISCONTINUED | OUTPATIENT
Start: 2024-11-11 | End: 2024-11-11 | Stop reason: SURG

## 2024-11-11 RX ORDER — HYDROCODONE BITARTRATE AND ACETAMINOPHEN 5; 325 MG/1; MG/1
1 TABLET ORAL EVERY 4 HOURS PRN
Status: DISCONTINUED | OUTPATIENT
Start: 2024-11-11 | End: 2024-11-11 | Stop reason: HOSPADM

## 2024-11-11 RX ORDER — FENTANYL CITRATE 50 UG/ML
INJECTION, SOLUTION INTRAMUSCULAR; INTRAVENOUS AS NEEDED
Status: DISCONTINUED | OUTPATIENT
Start: 2024-11-11 | End: 2024-11-11 | Stop reason: SURG

## 2024-11-11 RX ORDER — FENTANYL CITRATE 50 UG/ML
50 INJECTION, SOLUTION INTRAMUSCULAR; INTRAVENOUS
Status: DISCONTINUED | OUTPATIENT
Start: 2024-11-11 | End: 2024-11-11 | Stop reason: HOSPADM

## 2024-11-11 RX ORDER — MIDAZOLAM HYDROCHLORIDE 2 MG/2ML
1 INJECTION, SOLUTION INTRAMUSCULAR; INTRAVENOUS
Status: DISCONTINUED | OUTPATIENT
Start: 2024-11-11 | End: 2024-11-11 | Stop reason: HOSPADM

## 2024-11-11 RX ORDER — SODIUM CHLORIDE 0.9 % (FLUSH) 0.9 %
3 SYRINGE (ML) INJECTION EVERY 12 HOURS SCHEDULED
Status: DISCONTINUED | OUTPATIENT
Start: 2024-11-11 | End: 2024-11-11 | Stop reason: HOSPADM

## 2024-11-11 RX ORDER — GABAPENTIN 300 MG/1
600 CAPSULE ORAL ONCE
Status: COMPLETED | OUTPATIENT
Start: 2024-11-11 | End: 2024-11-11

## 2024-11-11 RX ORDER — ONDANSETRON 2 MG/ML
INJECTION INTRAMUSCULAR; INTRAVENOUS AS NEEDED
Status: DISCONTINUED | OUTPATIENT
Start: 2024-11-11 | End: 2024-11-11 | Stop reason: SURG

## 2024-11-11 RX ORDER — SODIUM CHLORIDE 9 MG/ML
40 INJECTION, SOLUTION INTRAVENOUS AS NEEDED
Status: DISCONTINUED | OUTPATIENT
Start: 2024-11-11 | End: 2024-11-11 | Stop reason: HOSPADM

## 2024-11-11 RX ORDER — LIDOCAINE HYDROCHLORIDE 20 MG/ML
INJECTION, SOLUTION EPIDURAL; INFILTRATION; INTRACAUDAL; PERINEURAL AS NEEDED
Status: DISCONTINUED | OUTPATIENT
Start: 2024-11-11 | End: 2024-11-11 | Stop reason: SURG

## 2024-11-11 RX ORDER — PROPOFOL 10 MG/ML
VIAL (ML) INTRAVENOUS AS NEEDED
Status: DISCONTINUED | OUTPATIENT
Start: 2024-11-11 | End: 2024-11-11 | Stop reason: SURG

## 2024-11-11 RX ORDER — SODIUM CHLORIDE 0.9 % (FLUSH) 0.9 %
10 SYRINGE (ML) INJECTION AS NEEDED
Status: DISCONTINUED | OUTPATIENT
Start: 2024-11-11 | End: 2024-11-11 | Stop reason: HOSPADM

## 2024-11-11 RX ORDER — MAGNESIUM HYDROXIDE 1200 MG/15ML
LIQUID ORAL AS NEEDED
Status: DISCONTINUED | OUTPATIENT
Start: 2024-11-11 | End: 2024-11-11 | Stop reason: HOSPADM

## 2024-11-11 RX ORDER — SCOLOPAMINE TRANSDERMAL SYSTEM 1 MG/1
1 PATCH, EXTENDED RELEASE TRANSDERMAL CONTINUOUS
Status: DISCONTINUED | OUTPATIENT
Start: 2024-11-11 | End: 2024-11-11 | Stop reason: HOSPADM

## 2024-11-11 RX ORDER — SODIUM CHLORIDE, SODIUM LACTATE, POTASSIUM CHLORIDE, CALCIUM CHLORIDE 600; 310; 30; 20 MG/100ML; MG/100ML; MG/100ML; MG/100ML
100 INJECTION, SOLUTION INTRAVENOUS CONTINUOUS
Status: DISCONTINUED | OUTPATIENT
Start: 2024-11-11 | End: 2024-11-11 | Stop reason: HOSPADM

## 2024-11-11 RX ORDER — NALOXONE HCL 0.4 MG/ML
0.4 VIAL (ML) INJECTION AS NEEDED
Status: DISCONTINUED | OUTPATIENT
Start: 2024-11-11 | End: 2024-11-11 | Stop reason: HOSPADM

## 2024-11-11 RX ORDER — HYDROCODONE BITARTRATE AND ACETAMINOPHEN 10; 325 MG/1; MG/1
1 TABLET ORAL EVERY 4 HOURS PRN
Status: DISCONTINUED | OUTPATIENT
Start: 2024-11-11 | End: 2024-11-11 | Stop reason: HOSPADM

## 2024-11-11 RX ORDER — DROPERIDOL 2.5 MG/ML
0.62 INJECTION, SOLUTION INTRAMUSCULAR; INTRAVENOUS ONCE AS NEEDED
Status: DISCONTINUED | OUTPATIENT
Start: 2024-11-11 | End: 2024-11-11 | Stop reason: HOSPADM

## 2024-11-11 RX ORDER — ACETAMINOPHEN 500 MG
1000 TABLET ORAL ONCE
Status: COMPLETED | OUTPATIENT
Start: 2024-11-11 | End: 2024-11-11

## 2024-11-11 RX ORDER — ONDANSETRON 2 MG/ML
4 INJECTION INTRAMUSCULAR; INTRAVENOUS ONCE AS NEEDED
Status: DISCONTINUED | OUTPATIENT
Start: 2024-11-11 | End: 2024-11-11 | Stop reason: HOSPADM

## 2024-11-11 RX ORDER — FLUMAZENIL 0.1 MG/ML
0.2 INJECTION INTRAVENOUS AS NEEDED
Status: DISCONTINUED | OUTPATIENT
Start: 2024-11-11 | End: 2024-11-11 | Stop reason: HOSPADM

## 2024-11-11 RX ORDER — SODIUM CHLORIDE, SODIUM LACTATE, POTASSIUM CHLORIDE, CALCIUM CHLORIDE 600; 310; 30; 20 MG/100ML; MG/100ML; MG/100ML; MG/100ML
20 INJECTION, SOLUTION INTRAVENOUS CONTINUOUS
Status: DISCONTINUED | OUTPATIENT
Start: 2024-11-11 | End: 2024-11-11 | Stop reason: HOSPADM

## 2024-11-11 RX ORDER — DEXAMETHASONE SODIUM PHOSPHATE 4 MG/ML
INJECTION, SOLUTION INTRA-ARTICULAR; INTRALESIONAL; INTRAMUSCULAR; INTRAVENOUS; SOFT TISSUE AS NEEDED
Status: DISCONTINUED | OUTPATIENT
Start: 2024-11-11 | End: 2024-11-11 | Stop reason: SURG

## 2024-11-11 RX ORDER — SODIUM CHLORIDE 0.9 % (FLUSH) 0.9 %
3 SYRINGE (ML) INJECTION AS NEEDED
Status: DISCONTINUED | OUTPATIENT
Start: 2024-11-11 | End: 2024-11-11 | Stop reason: HOSPADM

## 2024-11-11 RX ORDER — LABETALOL HYDROCHLORIDE 5 MG/ML
5 INJECTION, SOLUTION INTRAVENOUS
Status: DISCONTINUED | OUTPATIENT
Start: 2024-11-11 | End: 2024-11-11 | Stop reason: HOSPADM

## 2024-11-11 RX ORDER — IBUPROFEN 600 MG/1
600 TABLET, FILM COATED ORAL EVERY 6 HOURS PRN
Status: DISCONTINUED | OUTPATIENT
Start: 2024-11-11 | End: 2024-11-11 | Stop reason: HOSPADM

## 2024-11-11 RX ADMIN — CEFAZOLIN 2000 MG: 2 INJECTION, POWDER, FOR SOLUTION INTRAMUSCULAR; INTRAVENOUS at 14:16

## 2024-11-11 RX ADMIN — PROPOFOL 200 MG: 10 INJECTION, EMULSION INTRAVENOUS at 14:09

## 2024-11-11 RX ADMIN — LIDOCAINE HYDROCHLORIDE 100 MG: 20 INJECTION, SOLUTION EPIDURAL; INFILTRATION; INTRACAUDAL; PERINEURAL at 14:09

## 2024-11-11 RX ADMIN — FENTANYL CITRATE 100 MCG: 50 INJECTION, SOLUTION INTRAMUSCULAR; INTRAVENOUS at 14:09

## 2024-11-11 RX ADMIN — SCOPALAMINE 1 PATCH: 1 PATCH, EXTENDED RELEASE TRANSDERMAL at 12:33

## 2024-11-11 RX ADMIN — MIDAZOLAM HYDROCHLORIDE 1 MG: 1 INJECTION, SOLUTION INTRAMUSCULAR; INTRAVENOUS at 13:28

## 2024-11-11 RX ADMIN — ROCURONIUM 50 MG: 50 INJECTION, SOLUTION INTRAVENOUS at 14:09

## 2024-11-11 RX ADMIN — ONDANSETRON 4 MG: 2 INJECTION INTRAMUSCULAR; INTRAVENOUS at 14:43

## 2024-11-11 RX ADMIN — SUGAMMADEX 200 MG: 100 INJECTION, SOLUTION INTRAVENOUS at 14:43

## 2024-11-11 RX ADMIN — DEXAMETHASONE SODIUM PHOSPHATE 4 MG: 4 INJECTION INTRA-ARTICULAR; INTRALESIONAL; INTRAMUSCULAR; INTRAVENOUS; SOFT TISSUE at 14:17

## 2024-11-11 RX ADMIN — LIDOCAINE HYDROCHLORIDE 100 MG: 20 INJECTION, SOLUTION EPIDURAL; INFILTRATION; INTRACAUDAL; PERINEURAL at 14:49

## 2024-11-11 RX ADMIN — HYDROCODONE BITARTRATE AND ACETAMINOPHEN 1 TABLET: 5; 325 TABLET ORAL at 15:11

## 2024-11-11 RX ADMIN — ACETAMINOPHEN 1000 MG: 500 TABLET, FILM COATED ORAL at 08:54

## 2024-11-11 RX ADMIN — GABAPENTIN 600 MG: 300 CAPSULE ORAL at 08:54

## 2024-11-11 RX ADMIN — SODIUM CHLORIDE, POTASSIUM CHLORIDE, SODIUM LACTATE AND CALCIUM CHLORIDE 20 ML/HR: 600; 310; 30; 20 INJECTION, SOLUTION INTRAVENOUS at 09:04

## 2024-11-11 NOTE — ANESTHESIA PREPROCEDURE EVALUATION
Anesthesia Evaluation     no history of anesthetic complications:   NPO Solid Status: > 8 hours  NPO Liquid Status: > 8 hours           Airway   Mallampati: II  No difficulty expected  Dental      Pulmonary    (-) sleep apnea, not a smoker  Cardiovascular   Exercise tolerance: good (4-7 METS)    (+) hypertension, valvular problems/murmurs murmur  (-) CAD    ROS comment: Echo 2017, mild mr    Neuro/Psych  (-) seizures, TIA  GI/Hepatic/Renal/Endo    (-) liver disease, no renal disease, diabetes    Musculoskeletal     Abdominal    Substance History      OB/GYN          Other                      Anesthesia Plan    ASA 2     general     intravenous induction     Anesthetic plan, risks, benefits, and alternatives have been provided, discussed and informed consent has been obtained with: patient.    CODE STATUS:

## 2024-11-11 NOTE — ANESTHESIA POSTPROCEDURE EVALUATION
Patient: Kate Veras    Procedure Summary       Date: 11/11/24 Room / Location:  [facility] 06 /  [facility]    Anesthesia Start: 1406 Anesthesia Stop: 1500    Procedure: SALPINGECTOMY LAPAROSCOPIC (Bilateral: Abdomen) Diagnosis:       Encounter for sterilization      (Encounter for sterilization [Z30.2])    Surgeons: Fanta Javier MD Provider: Son Wheat CRNA    Anesthesia Type: general ASA Status: 2            Anesthesia Type: general    Vitals  Vitals Value Taken Time   /86 11/11/24 1520   Temp 99 °F (37.2 °C) 11/11/24 1520   Pulse 78 11/11/24 1520   Resp 16 11/11/24 1520   SpO2 97 % 11/11/24 1520           Post Anesthesia Care and Evaluation    Patient location during evaluation: PHASE II  Patient participation: complete - patient participated  Level of consciousness: awake and alert  Pain score: 0  Pain management: adequate    Airway patency: patent  Anesthetic complications: No anesthetic complications  PONV Status: none  Cardiovascular status: acceptable  Respiratory status: acceptable  Hydration status: acceptable

## 2024-11-11 NOTE — ANESTHESIA PROCEDURE NOTES
Airway  Urgency: elective    Date/Time: 11/11/2024 2:10 PM  Airway not difficult    General Information and Staff    Patient location during procedure: OR  CRNA/CAA: Son Wheat CRNA    Indications and Patient Condition  Indications for airway management: airway protection    Preoxygenated: yes  Mask difficulty assessment: 1 - vent by mask    Final Airway Details  Final airway type: endotracheal airway      Successful airway: ETT  Cuffed: yes   Successful intubation technique: direct laryngoscopy  Blade: Vicente  Blade size: 2  ETT size (mm): 7.5  Cormack-Lehane Classification: grade I - full view of glottis  Placement verified by: chest auscultation and capnometry   Measured from: lips  ETT/EBT  to lips (cm): 21  Number of attempts at approach: 1  Assessment: lips, teeth, and gum same as pre-op and atraumatic intubation

## 2024-11-11 NOTE — H&P
"Subjective      Kate Veras is a 34 y.o. female who presents for her routine annual exam. Overall, patient reports to be feeling \"great\".  Periods are irregular - about every other months she will skip her menses completely; and on the months that she does bleed, it is light. Patient pleased with current contraception since Lo Loestrin has decreased her bleeding so much compared to what it used to be.      Current contraception: OCP (estrogen/progesterone)  Regular self breast exam: yes  History of abnormal Pap smear: yes - in 2010, all normal since that time .  Most recent normal was in 2022  History of abnormal mammogram:  N/A  Exercise: infrequently, but is active with two young children    The following portions of the patient's history were reviewed and updated as appropriate: allergies, current medications, past family history, past medical history, past social history, past surgical history, and problem list.    Patient tired of taking OCP's and and wants to have a salpingectomy.  Patient is pleased with changes that pills have made in her bleeding profile, but still wants to have a salpingectomy.  We have discussed that her bleeding will go back to whatever it was prior to starting the Lo Loestrin.      heterosexual    Family History  Family History   Problem Relation Age of Onset    Osteoporosis Mother     Hypertension Mother     Diabetes Father     Hypertension Father     Hypertension Maternal Grandmother     Diabetes Paternal Grandfather     Diabetes Paternal Grandmother      Past Medical History:   Diagnosis Date    Abnormal Pap smear of cervix 2013?    Anxiety     Ectopic pregnancy 9/2017    Gestational hypertension 12/2015    Heart murmur     was told wouldn't cause problems    Mild depression     Pre-eclampsia     Pregnancy complication     gestational diabetes    Recurrent pregnancy loss, antepartum condition or complication 9/2017    Methotrexate given      Past Surgical History:   Procedure " Laterality Date     SECTION      x1    LASIK      TONSILLECTOMY      WISDOM TOOTH EXTRACTION  ?      Social History     Socioeconomic History    Marital status:    Tobacco Use    Smoking status: Former     Current packs/day: 0.00     Average packs/day: 0.5 packs/day for 4.0 years (2.0 ttl pk-yrs)     Types: Cigarettes     Start date: 2009     Quit date: 2013     Years since quittin.8    Smokeless tobacco: Never    Tobacco comments:        Vaping Use    Vaping status: Never Used   Substance and Sexual Activity    Alcohol use: Yes     Comment: occ    Drug use: No    Sexual activity: Yes     Partners: Male     Birth control/protection: Pill      Review of Systems  Review of Systems   Constitutional:  Negative for activity change and unexpected weight loss.   HENT:  Negative for congestion.    Respiratory:  Negative for shortness of breath.    Cardiovascular:  Negative for chest pain.   Gastrointestinal:  Negative for abdominal pain, blood in stool, constipation and diarrhea.   Endocrine: Negative for cold intolerance and heat intolerance.   Genitourinary:  Negative for breast lump, breast pain, decreased libido, difficulty urinating, dyspareunia, menstrual problem (irregular, sometimes skipping months and other months very light.  This is an improvement since being on lo Loestrin), pelvic pain, urinary incontinence, vaginal bleeding, vaginal discharge and vaginal pain.   Musculoskeletal:  Positive for back pain (attributes to age). Negative for arthralgias.   Skin:  Negative for rash.   Neurological:  Negative for dizziness and headache.   Psychiatric/Behavioral:  Positive for agitation (gets easily irritated for past few months). Negative for sleep disturbance and depressed mood. The patient is nervous/anxious (just rarely takes avitan).              Objective        /81 (BP Location: Left arm, Patient Position: Sitting)   Pulse 78   Temp 98 °F (36.7 °C) (Temporal)   Resp  "16   SpO2 100%   Physical Exam  Vitals and nursing note reviewed. Exam conducted with a chaperone present.   Constitutional:       General: She is not in acute distress.     Appearance: Normal appearance. She is well-developed.   HENT:      Head: Normocephalic and atraumatic.   Cardiovascular:      Rate and Rhythm: Normal rate and regular rhythm.      Heart sounds: No murmur heard.  Pulmonary:      Effort: Pulmonary effort is normal.      Breath sounds: Normal breath sounds.   Chest:   Breasts:     Right: No inverted nipple or mass.      Left: No inverted nipple or mass.   Abdominal:      General: There is no distension.      Palpations: Abdomen is soft.      Tenderness: There is no abdominal tenderness.   Genitourinary:     General: Normal vulva.      Exam position: Lithotomy position.      Labia:         Right: No tenderness or lesion.         Left: No tenderness or lesion.       Vagina: Normal. No vaginal discharge, tenderness or bleeding.      Cervix: No cervical motion tenderness, discharge or friability.      Adnexa:         Right: No tenderness or fullness.          Left: No tenderness or fullness.        Rectum: Normal.   Musculoskeletal:         General: Normal range of motion.      Cervical back: Normal range of motion and neck supple.   Skin:     General: Skin is warm and dry.   Neurological:      Mental Status: She is alert and oriented to person, place, and time.   Psychiatric:         Mood and Affect: Mood normal.         Behavior: Behavior normal.         Thought Content: Thought content normal.         Judgment: Judgment normal.               Assessment  & Plan    Diagnoses and all orders for this visit:    1. Women's annual routine gynecological examination (Primary): Exam un remarkable.  Since weight loss on Weygovy, the patient reports she feels \"great\".  Routine screening labs per PCP, who is helping manage weight loss medications.  Regular self breast exam encouraged; mammograms to begin at age " 39 since the patient has no family history.  Patient very pleased with improvement in her bleeding profile since starting low Loestrin last year at her annual exam.  Patient declines STD testing.  Pap not indicated until next year since she had a normal one in 2022    2. Anxiety and depression: Patient feels like she has been extra irritable over small things in the last several months.  She would like to try daily medication to help with irritability that she thinks is due to anxiety  -     sertraline (Zoloft) 25 MG tablet; Take 1 tablet by mouth Daily.  Dispense: 30 tablet; Refill: 11    3. Encounter for sterilization: Despite the fact that her bleeding profile is better on combination OCPs, the patient says she does not like having to take a daily medication and desires permanent sterilization.  We had talked about it last year, but the patient had elected for OCPs at the time.  The patient understands that her sterilization will include complete removal of the fallopian tubes, and that this is permanent/irreversible.  Risks of laparoscopic surgery were reviewed to include, but are not limited to, the possibility of bowel perforation requiring possible bowel resection and/or colostomy, possible bladder injury or possible and possible vascular injury which could require the need for a blood transfusion.  Possible need for conversion to a laparotomy was also discussed.  The patient's questions were answered to her satisfaction.  Post-op restrictions and typical post-op course were also reviewed.   -     Case Request; Standing  -     CBC and Differential; Future  -     Type and screen; Future  -     ECG 12 Lead; Future  -     sodium chloride 0.9 % flush 3 mL  -     sodium chloride 0.9 % flush 10 mL  -     sodium chloride 0.9 % infusion 40 mL  -     ceFAZolin (ANCEF) 2,000 mg in sodium chloride 0.9 % 100 mL IVPB  -     acetaminophen (TYLENOL) tablet 1,000 mg  -     gabapentin (NEURONTIN) capsule 600 mg  -      scopolamine patch 1 mg/72 hr  -     Case Request    4. Former smoker    Other orders  -     Code Status and Medical Interventions: CPR (Attempt to Resuscitate); Full Support; Standing  -     Follow Anesthesia Guidelines / Protocol; Future  -     Follow Anesthesia Guidelines / Protocol; Standing  -     Chlorhexidine Skin Prep; Future  -     Verify / Perform Chlorhexidine Skin Prep; Standing  -     Insert Peripheral IV; Standing  -     Saline Lock & Maintain IV Access; Standing  -     Place Sequential Compression Device; Standing  -     Maintain Sequential Compression Device; Standing  -     Obtain informed consent; Standing  -     Verify NPO Status; Standing      This note was electronically signed.    Patient seen in holding area.  She denied any changes in her symptoms or status.  Patient had no questions about scheduled procedure.    Fanta Javier MD  11/11/2024  14:00 CST

## 2024-11-11 NOTE — OP NOTE
Werner Veras  : 1990  MRN: [id number]  [facility]: [id number]  Date: 2024    Operative Note    SALPINGECTOMY LAPAROSCOPIC      Pre-op Diagnosis:  Encounter for sterilization [Z30.2]   Post-op Diagnosis:  Post-Op Diagnosis Codes:     * Encounter for sterilization [Z30.2]   Procedure: Procedure(s):  SALPINGECTOMY LAPAROSCOPIC   Surgeon: Surgeon(s):  Fanta Javier MD       Anesthesia: General   Estimated Blood Loss: none   Fluids: 550   mls   UOP: clear   ABx: 2 grams ancef   Specimens:  Bilateral fallopian tubes   Findings: Normal pelvis   Complications: none     Description of Procedure:      After informed consent was obtained, the patient was taken to the operating room, where general anesthesia was administered. She was placed in the lithotomy position [facility], and her abdomen, perineum and vagina were prepped and draped in normal sterile fashion. A speculum was placed in the vagina and a [employer] uterine manipulator placed through the cervical os and clamped to the anterior lip of the cervix.  Her bladder drained with a latex free catheter.   The patient's abdomen was insufflated using a Veress needle at the umbilicus.  Following appropriate insufflation, a 5 mm Optiview trocar was used at the same location.  The abdomen was surveyed and found to be free of any adhesive disease or scar tissue to the anterior abdominal wall.  An additional 8 mm trocar was placed in the suprapubic region, in the midline, above the pubic symphysis, and another 5 mm port placed in the LLQ. The patient was then placed in Trendelenburg position and the bowel swept out of the pelvis with a blunt probe and/or Orest[employer] Geck grasper.  The left fallopian tube was grasp with the Orestes [employer] Geck grasper and placed under gentle tension as the Gyrus harmonic scalpel device was used to dissect it away from the adjacent ovary and transect across the diameter of the tube near the cornua of the uterus.  All surgical  pedicles were checked for hemostasis.  The identical procedure was then performed on the right fallopian tube.  The patient's uterus, both ovaries, and both fallopian tubes all appeared completely normal.  There was no free fluid or blood in the patient's pelvis.  The laparoscope was removed and the gas allowed to escape the abdomen through the umbilical port.  Trochars were removed.   Subcutaneous tissue at all incisions was reapproximated with interrupted stitches using 3-0 Vicryl.  The instruments were then removed from the vagina and the Hulka site noted to be hemostatic.  Skin Affix was placed over the incisions as a bandage.       The patient was then awakened and taken to the recovery room in stable condition.  She tolerated the procedure well and without complications.  All sponge needle and instrument counts were correct times 2 per the OR staff.      Fanta Javier MD   11/11/2024  14:45 CST

## 2024-11-14 LAB
CYTO UR: NORMAL
LAB AP CASE REPORT: NORMAL
Lab: NORMAL
PATH REPORT.FINAL DX SPEC: NORMAL
PATH REPORT.GROSS SPEC: NORMAL

## 2024-11-27 ENCOUNTER — OFFICE VISIT (OUTPATIENT)
Dept: OBSTETRICS AND GYNECOLOGY | Age: 34
End: 2024-11-27
Payer: COMMERCIAL

## 2024-11-27 VITALS
WEIGHT: 143 LBS | BODY MASS INDEX: 28.83 KG/M2 | DIASTOLIC BLOOD PRESSURE: 90 MMHG | HEIGHT: 59 IN | SYSTOLIC BLOOD PRESSURE: 142 MMHG

## 2024-11-27 DIAGNOSIS — Z09 S/P GYNECOLOGICAL SURGERY, FOLLOW-UP EXAM: Primary | ICD-10-CM

## 2024-11-27 RX ORDER — FLUTICASONE PROPIONATE 50 MCG
SPRAY, SUSPENSION (ML) NASAL
COMMUNITY

## 2024-11-27 NOTE — PROGRESS NOTES
"Subjective   Chief Complaint   Patient presents with    Post-op     Pt here today for 2 week post op LSC BS. Pt feeling well      Kate Veras is a 34 y.o. year old  presenting to be seen for her post-operative visit.  She had a bilateral salpingectomy 2 weeks ago.  Currently she reports pain for only a two or three days after surgery, but is not having any pain at this time.  She is not having any vaginal bleeding.    No Additional Complaints Reported    The following portions of the patient's history were reviewed and updated as appropriate:current medications and allergies    Review of Systems      Objective   /90   Ht 149.9 cm (59\")   Wt 64.9 kg (143 lb)   LMP 2024 (Approximate)   BMI 28.88 kg/m²     General:  well developed; well nourished  no acute distress  mentation appropriate   Abdomen: soft, non-tender; no masses  incisions are healed, clean, dry, intact, and without drainage   Pelvis: Not performed.     Final Diagnosis   Right and left fallopian tubes, bilateral salpingectomy:  Completely transected right and left fallopian tube segments.  Mesonephric cysts.   Electronically signed by Yasmani Bautista MD on 2024 at 0919        Assessment   Pt is 2 weeks s/p bilateral salpingectomy  Doing well, not having any pain at all  Pathology reviewed as benign     Plan   No concerns  RTO for annual exam    No orders of the defined types were placed in this encounter.         This note was electronically signed.    Fanta Javier MD  2024  "

## (undated) DEVICE — SOL ANTISTICK CAUTRY ELECTROLUBE LF

## (undated) DEVICE — ENDOPATH XCEL WITH OPTIVIEW TECHNOLOGY BLADELESS TROCARS WITH STABILITY SLEEVES: Brand: ENDOPATH XCEL OPTIVIEW

## (undated) DEVICE — SUT VIC 0 UR6 27IN VCP603H

## (undated) DEVICE — CLTH CLENS READYCLEANSE PERI CARE PK/5

## (undated) DEVICE — PK LAP GYN 30

## (undated) DEVICE — ST TBG PNEUMOCLEAR EVAC SMOKE HIFLO

## (undated) DEVICE — ADHS SKIN PREMIERPRO EXOFIN TOPICAL HI/VISC .5ML

## (undated) DEVICE — GLOVE,SURG,SENSICARE,ALOE,LF,PF,6: Brand: MEDLINE

## (undated) DEVICE — ENDOPATH XCEL BLADELESS TROCARS WITH STABILITY SLEEVES: Brand: ENDOPATH XCEL

## (undated) DEVICE — ENDOPATH XCEL WITH OPTIVIEW TECHNOLOGY UNIVERSAL TROCAR STABILITY SLEEVES: Brand: ENDOPATH XCEL OPTIVIEW

## (undated) DEVICE — ANTIBACTERIAL UNDYED BRAIDED (POLYGLACTIN 910), SYNTHETIC ABSORBABLE SUTURE: Brand: COATED VICRYL